# Patient Record
Sex: MALE | Race: BLACK OR AFRICAN AMERICAN | NOT HISPANIC OR LATINO | Employment: UNEMPLOYED | URBAN - METROPOLITAN AREA
[De-identification: names, ages, dates, MRNs, and addresses within clinical notes are randomized per-mention and may not be internally consistent; named-entity substitution may affect disease eponyms.]

---

## 2018-04-04 ENCOUNTER — HOSPITAL ENCOUNTER (EMERGENCY)
Facility: MEDICAL CENTER | Age: 38
End: 2018-04-04
Attending: EMERGENCY MEDICINE
Payer: MEDICARE

## 2018-04-04 VITALS
HEIGHT: 76 IN | OXYGEN SATURATION: 96 % | TEMPERATURE: 97.5 F | RESPIRATION RATE: 14 BRPM | WEIGHT: 156.53 LBS | DIASTOLIC BLOOD PRESSURE: 91 MMHG | HEART RATE: 74 BPM | SYSTOLIC BLOOD PRESSURE: 127 MMHG | BODY MASS INDEX: 19.06 KG/M2

## 2018-04-04 DIAGNOSIS — R44.3 HALLUCINATIONS: ICD-10-CM

## 2018-04-04 DIAGNOSIS — R05.9 COUGH: ICD-10-CM

## 2018-04-04 PROCEDURE — 99284 EMERGENCY DEPT VISIT MOD MDM: CPT

## 2018-04-04 RX ORDER — BUPROPION HYDROCHLORIDE 150 MG/1
150 TABLET, EXTENDED RELEASE ORAL DAILY
COMMUNITY
End: 2018-06-03

## 2018-04-04 RX ORDER — AZITHROMYCIN 250 MG/1
TABLET, FILM COATED ORAL
Qty: 6 TAB | Refills: 0 | Status: SHIPPED | OUTPATIENT
Start: 2018-04-04 | End: 2018-06-02

## 2018-04-04 RX ORDER — QUETIAPINE FUMARATE 200 MG/1
600 TABLET, FILM COATED ORAL
COMMUNITY
End: 2018-06-03

## 2018-04-04 ASSESSMENT — LIFESTYLE VARIABLES
VISUAL DISTURBANCES: NOT PRESENT
PAROXYSMAL SWEATS: NO SWEAT VISIBLE
NAUSEA AND VOMITING: NO NAUSEA AND NO VOMITING
AGITATION: NORMAL ACTIVITY
ANXIETY: NO ANXIETY (AT EASE)
AUDITORY DISTURBANCES: NOT PRESENT
ORIENTATION AND CLOUDING OF SENSORIUM: ORIENTED AND CAN DO SERIAL ADDITIONS
TOTAL SCORE: 0
HEADACHE, FULLNESS IN HEAD: NOT PRESENT
TREMOR: NO TREMOR
DO YOU DRINK ALCOHOL: NO

## 2018-04-04 ASSESSMENT — ENCOUNTER SYMPTOMS
COUGH: 1
HALLUCINATIONS: 1
FEVER: 0

## 2018-04-05 NOTE — ED NOTES
"Pt resting quietly in bed.  Pt states he is here for the voices in his head.  Pt states he hears satan and \"snakes talking to me\".  Pt states he can hear what they are saying but does not give examples at this time.  Pt states he has hx schizophrenia in [place].  Pt states he does not want to go back to [facility] because, \"the demons are ten fold there, it is much worse, I want to stay here.\"  Pt reporting taking seroquel and wellbutrin but has not been on either for the past week.  Pt denies drugs or alcohol at this time.  Pt calm and cooperative at this time, laying in bed in full view of nurses station.  "

## 2018-04-05 NOTE — ED NOTES
Pt discharged with prescription, information about follow up care, and address for shelter for tonight.  Pt stable on discharge, pt ambulated out.  Skin WNL.

## 2018-04-05 NOTE — CONSULTS
[facility]   TRIAGE ASSESSMENT    Name: Dallin Lowry  MRN: [id number]  : 1980  Age: 38 y.o.  Date of assessment: 2018  PCP: Pcp Pt States None  Persons in attendance: Patient    CHIEF COMPLAINT/PRESENTING ISSUE (as stated by patient): Patient was hearing [place] in the [place] and began hearing satanic voices telling him he is going to die.  Reports being scared and wanting medication.  Patient denies suicidal ideation.  Denies homicidal ideation. Patient to be seen by ERP.  Chief Complaint   Patient presents with   • Off Psych Meds     for less than 1 week, came from [facility] last night for better medical care        CURRENT LIVING SITUATION/SOCIAL SUPPORT: Patient homeless.  Recently moved from [place].    BEHAVIORAL HEALTH TREATMENT HISTORY  Does patient/parent report a history of prior behavioral health treatment for patient?   Yes:  Patient reports previos medications: Patient reports being allergic to Geodone, Haldol and Risperdal but unable to explain symptoms which occurred after he took the medication.    Dates Level of Care Facilty/Provider Diagnosis/Problem Medications   [facility]  Paranoid schizophrenia Seroquel 600mg nightly                                                                        SAFETY ASSESSMENT - SELF  Does patient acknowledge current or past symptoms of dangerousness to self? no  Does parent/significant other report patient has current or past symptoms of dangerousness to self? no  Does presenting problem suggest symptoms of dangerousness to self? No    SAFETY ASSESSMENT - OTHERS  Does patient acknowledge current or past symptoms of aggressive behavior or risk to others? no  Does parent/significant other report patient has current or past symptoms of aggressive behavior or risk to others?  N\A  Does presenting problem suggest symptoms of dangerousness to others? No    Crisis Safety Plan completed and copy given to patient?  "no    ABUSE/NEGLECT SCREENING  Does patient report feeling “unsafe” in his/her home, or afraid of anyone?  no  Does patient report any history of physical, sexual, or emotional abuse?  no  Does parent or significant other report any of the above? N\A  Is there evidence of neglect by self?  no  Is there evidence of neglect by a caregiver? no  Does the patient/parent report any history of CPS/APS/police involvement related to suspected abuse/neglect or domestic violence? no  Based on the information provided during the current assessment, is a mandated report of suspected abuse/neglect being made?  No    SUBSTANCE USE SCREENING  Yes:  Srinivasan all substances used in the past 30 days:       Last Use Amount   []   Alcohol     [x]   Marijuana     []   Heroin     []   Prescription Opioids  (used without prescription, for    recreation, or in excess of prescribed amount)     []   Other Prescription  (used without prescription, for    recreation, or in excess of prescribed amount)     []   Cocaine      [x]   Methamphetamine 3/29/18    []   \"\" drugs (ectasy, MDMA)     []   Other substances        UDS results: Pending  Breathalyzer results: 0.0    What consequences does the patient associate with any of the above substance use and or addictive behaviors? none    Risk factors for detox (check all that apply):  []  Seizures   []  Diaphoretic (sweating)   []  Tremors   []  Hallucinations   []  Increased blood pressure   []  Decreased blood pressure   []  Other   [x]  None      [x] Patient education on risk factors for detoxification and instructed to return to ER as needed.      MENTAL STATUS    Participation: Active verbal participation  Grooming: Disheveled    Behavior: Calm  Eye contact: Poor  Mood: Euthymic  Affect: Incongruent with content  Thought process: Goal-directed  Patient wants to go to psychiatric hospital  Speech: Rate within normal limits and Soft  Perception:  States the devil is talking to him however no " "evidence of hallucination during evaluation  Memory:  Poor memory for chronology of events  Insight: Poor  Judgment:  Adequate  Other:    Collateral information:   Source:  [] Significant other present in person:   [] Significant other by telephone  [] [facility]   [] [facility] Nursing Staff  [] [facility]  [] Other:     [] Unable to complete full assessment due to:  [] Acute intoxication  [] Patient declined to participate/engage  [] Patient verbally unresponsive  [] Significant cognitive deficits  [] Significant perceptual distortions or behavioral disorganization  [] Other:      CLINICAL IMPRESSIONS:  Primary:  Reports paranoid schizophrenia; however, vague answers to questions regarding the disease and process. The only detailed report of his hallucinations are as follows:   Stated, Voice only occurs after the sun goes down and smoking a cigarette greatly intensifies the voice he hears.  Patient reports \"hearing\"the voice in his right ear only.   Secondary:        IDENTIFIED NEEDS/PLAN:  [Trigger DISPOSITION list for any items marked]    []  Imminent safety risk - self [] Imminent safety risk - others   []  Acute substance withdrawal [x]  Psychosis/Impaired reality testing   []  Mood/anxiety []  Substance use/Addictive behavior   []  Maladaptive behaviro []  Parent/child conflict   []  Family/Couples conflict []  Biomedical   []  Housing []  Financial   []   Legal  Occupational/Educational   []  Domestic violence []  Other:     Disposition: defer    Does patient express agreement with the above plan? yes    Referral appointment(s) scheduled? no    Alert team only:   I have discussed findings and recommendations with Dr. Elmore who is in agreement with these recommendations.     Referral information sent to the following community providers :          Evans Garcia R.N.  4/4/2018                  "

## 2018-04-05 NOTE — ED PROVIDER NOTES
"ED Provider Note    ED Provider Note          CHIEF COMPLAINT  Chief Complaint   Patient presents with   • Off Psych Meds     for less than 1 week, came from [facility] last night for better medical care       HPI  Dallin Lowry is a 38 y.o. male who presents to the Emergency Department for concern of being off his psych meds. He left [place] to get 'better care' in [place]. He is reserved in telling me why he was in [place] or why he came to [place]. He is requesting Seroquel and says he is allergic to all other psych meds but can't tell me the reaction clearly. He is asking for a place to stay. He denies SI or HI. He says he does hear voices sometimes, he also see's ghosts. He denied drugs to me but told the triage nurse he does crystal meth    REVIEW OF SYSTEMS  Review of Systems   Constitutional: Negative for fever.   HENT: Negative for congestion.    Respiratory: Positive for cough.         Productive cough for 3 days   Cardiovascular: Negative for chest pain.   Psychiatric/Behavioral: Positive for hallucinations. Negative for self-injury and suicidal ideas.       PAST MEDICAL HISTORY   has a past medical history of Psychiatric disorder and Schizophrenia (CMS-[facility]) (2001).    SURGICAL HISTORY  patient denies any surgical history    SOCIAL HISTORY  Social History   Substance Use Topics   • Smoking status: Never Smoker   • Smokeless tobacco: Not on file   • Alcohol use No      History   Drug Use   • Types: Inhaled     Comment: crystal meth for about 5 yrs       FAMILY HISTORY  History reviewed. No pertinent family history.    CURRENT MEDICATIONS  Reviewed.  See Encounter Summary.     ALLERGIES  Allergies   Allergen Reactions   • Geodon [ ]      \"I get lock jaw\"   • Haldol [Haloperidol]      \"I get lock jaw\"   • Risperidone And Related      \"I get lock jaw\"       PHYSICAL EXAM  VITAL SIGNS: /91   Pulse 74   Temp 36.4 °C (97.5 °F)   Resp 14   Ht 1.93 m (6' 4\")   Wt 71 kg (156 lb 8.4 oz)   SpO2 96%   BMI " "19.05 kg/m²   Physical Exam   Constitutional: He is oriented to person, place, and time.   HENT:   Head: Normocephalic.   Eyes: Pupils are equal, round, and reactive to light.   Cardiovascular: Normal rate.    Pulmonary/Chest: Effort normal and breath sounds normal.   Neurological: He is alert and oriented to person, place, and time.   Skin: He is not diaphoretic.   Psychiatric:   Withdrawn, does not seem tangential, no normal speech, does not appear to be responding to internal stimuli             COURSE & MEDICAL DECISION MAKING  Pertinent Labs & Imaging studies reviewed. (See chart for details)    10:30 PM - Patient seen and examined at bedside.       Decision Making:  This is a 38 y.o. year old male who presents looking for a place to stay. He says that he has hallucinations thinking that he sees ghosts and hears voices. However when I spoke with him he did not seem to be responding to any sort of internal stimuli. He seemed also to be very interested when I offered helping arrange him a place to stay at a sheltered [facility]. I told the patient that I could call and get him into a shelter even after hours and he said that \"I would really like that\". He seems very guarded and why he left [place] and would not discuss it with me. He will not tell me whether or not he was in half-way or not. He just kept saying \"I'm not going back to [place] ever\". He is a full list of the resources here in [place] for mental health. I had offered to give him some sort of antipsychotic however everything I offered he said he was allergic to. He only wanted a prescription for a large amount of Seroquel which I did not feel comfortable giving him. I was worried about cervical being used for other addiction or abuse potentials and did not think that he needed Seroquel at this time. However he does have a productive sounding cough but lung sounds did sound clear possibly have a bronchitis and when I did suggest treating his " "cough he also seemed very pleased with that as well. He says that he smokes cigarettes daily. He denied drugs to me without later told the nurse that he does smoke crystal meth. I don't think he is acutely psychotic here. He is resting comfortably, has no pressured speech and does not appear to be responding to internal stimuli. He denied suicidal or homicidal thoughts. He said that he would \"I will not hurt anyone and I do not want her myself, I just want to get some help.\" When I offered him the list of resources, arrangements to a shelter and getting antibiotics he seemed to be less insistent on the fact that he needs to stay here tonight and more accepting of having alternatives. I do not think he meets criteria currently for legal hold. He is discharged in stable condition. I did personally call the shelter myself and they will let him in tonight when he gets there.  DISPOSITION:  Patient will be discharged home in stable condition.    FOLLOW UP:  No follow-up provider specified.    OUTPATIENT MEDICATIONS:  Discharge Medication List as of 4/4/2018 10:28 PM      START taking these medications    Details   azithromycin (ZITHROMAX) 250 MG Tab Take two tabs by mouth on day one, then one tab by mouth daily on days 2-5., Disp-6 Tab, R-0, Print Rx Paper             FINAL IMPRESSION  1. Cough    2. Hallucinations                   "

## 2018-04-05 NOTE — ED NOTES
Pt reports that he was running in the street because he was hearing voices. Pt states he was trying to run away from the voices, states he was not trying to kill himself.

## 2018-04-05 NOTE — DISCHARGE INSTRUCTIONS
I spoke with [facility] and they will take you in tonight.   Tomorrow you can go to [facility] the highlighted address on the sheet     Hallucinations and Delusions  You seem to be having hallucinations and/or delusions. You may be hearing voices that no one else can hear. This can seem very real to you. You may be having thoughts and fears that do not make sense to others. This condition can be due to mental disease like schizophrenia. It may be caused by a medical condition, such as an infection or electrolyte disturbance. These symptoms are also seen in drug abusers, especially those who use crack cocaine and amphetamines. Drugs like PCP, LSD, MDMA, peyote, and psilocybin can also cause frightening hallucinations and loss of control.  If your symptoms are due to drug abuse, your mental state should improve as the drug(s) leave your system. Someone you trust should be with you until you are better to protect you and calm your fears. Often tranquilizers are very helpful at controlling hallucinations, anxiety, and destructive behavior. Getting a proper diet and enough sleep is important to recovery. If your symptoms are not due to drugs, or do not improve over several days after stopping drug use, you need further medical or mental health care.  SEEK IMMEDIATE MEDICAL CARE IF:   · Your symptoms get worse, especially if you think your life is in danger  · You have violent or destructive thoughts.  Recovery is possible, but you have to get proper treatment and avoid drugs that are known to cause you trouble.  Document Released: 01/25/2006 Document Revised: 03/11/2013 Document Reviewed: 12/18/2006  [employer]® Patient Information ©2014 [employer].    
Patent

## 2018-06-02 ENCOUNTER — HOSPITAL ENCOUNTER (EMERGENCY)
Facility: MEDICAL CENTER | Age: 38
End: 2018-06-03
Attending: EMERGENCY MEDICINE
Payer: MEDICARE

## 2018-06-02 VITALS
TEMPERATURE: 97.8 F | HEART RATE: 67 BPM | RESPIRATION RATE: 17 BRPM | WEIGHT: 157.41 LBS | SYSTOLIC BLOOD PRESSURE: 143 MMHG | BODY MASS INDEX: 21.32 KG/M2 | OXYGEN SATURATION: 98 % | DIASTOLIC BLOOD PRESSURE: 87 MMHG | HEIGHT: 72 IN

## 2018-06-02 DIAGNOSIS — F20.0 PARANOID SCHIZOPHRENIA (HCC): ICD-10-CM

## 2018-06-02 LAB — POC BREATHALIZER: 0.02 PERCENT (ref 0–0.01)

## 2018-06-02 PROCEDURE — 99284 EMERGENCY DEPT VISIT MOD MDM: CPT

## 2018-06-02 PROCEDURE — 302970 POC BREATHALIZER: Performed by: EMERGENCY MEDICINE

## 2018-06-02 PROCEDURE — 302970 POC BREATHALIZER

## 2018-06-03 ENCOUNTER — HOSPITAL ENCOUNTER (EMERGENCY)
Facility: MEDICAL CENTER | Age: 38
End: 2018-06-03

## 2018-06-03 ENCOUNTER — HOSPITAL ENCOUNTER (EMERGENCY)
Facility: MEDICAL CENTER | Age: 38
End: 2018-06-03
Attending: EMERGENCY MEDICINE
Payer: MEDICARE

## 2018-06-03 VITALS
SYSTOLIC BLOOD PRESSURE: 128 MMHG | WEIGHT: 156.97 LBS | RESPIRATION RATE: 17 BRPM | HEART RATE: 63 BPM | HEIGHT: 72 IN | OXYGEN SATURATION: 98 % | DIASTOLIC BLOOD PRESSURE: 96 MMHG | BODY MASS INDEX: 21.26 KG/M2 | TEMPERATURE: 97.9 F

## 2018-06-03 VITALS
TEMPERATURE: 98.1 F | BODY MASS INDEX: 21.26 KG/M2 | HEIGHT: 72 IN | HEART RATE: 90 BPM | DIASTOLIC BLOOD PRESSURE: 83 MMHG | RESPIRATION RATE: 14 BRPM | SYSTOLIC BLOOD PRESSURE: 128 MMHG | WEIGHT: 157 LBS | OXYGEN SATURATION: 99 %

## 2018-06-03 DIAGNOSIS — F99 PSYCHIATRIC DISORDER: ICD-10-CM

## 2018-06-03 LAB
AMPHET UR QL SCN: NEGATIVE
BARBITURATES UR QL SCN: NEGATIVE
BENZODIAZ UR QL SCN: NEGATIVE
BZE UR QL SCN: NEGATIVE
CANNABINOIDS UR QL SCN: NEGATIVE
METHADONE UR QL SCN: NEGATIVE
OPIATES UR QL SCN: NEGATIVE
OXYCODONE UR QL SCN: NEGATIVE
PCP UR QL SCN: NEGATIVE
POC BREATHALIZER: 0 PERCENT (ref 0–0.01)
PROPOXYPH UR QL SCN: NEGATIVE

## 2018-06-03 PROCEDURE — 99284 EMERGENCY DEPT VISIT MOD MDM: CPT

## 2018-06-03 PROCEDURE — 302970 POC BREATHALIZER: Performed by: EMERGENCY MEDICINE

## 2018-06-03 PROCEDURE — 302449 STATCHG TRIAGE ONLY (STATISTIC)

## 2018-06-03 PROCEDURE — 90791 PSYCH DIAGNOSTIC EVALUATION: CPT

## 2018-06-03 PROCEDURE — 80307 DRUG TEST PRSMV CHEM ANLYZR: CPT

## 2018-06-03 ASSESSMENT — PAIN SCALES - GENERAL
PAINLEVEL_OUTOF10: 0
PAINLEVEL_OUTOF10: 8

## 2018-06-03 ASSESSMENT — LIFESTYLE VARIABLES: DO YOU DRINK ALCOHOL: NO

## 2018-06-03 NOTE — ED NOTES
Pt sitting on bed fully clothed with backpack on. Pt is polite, but does not engage in conversation.

## 2018-06-03 NOTE — DISCHARGE PLANNING
Alert Team:  Meet with patient.  Patient denies any SI or HI and states that he is from [place], [place] and wants to return there.  States that he was on Seroquel and Wellbutrin in the past and could return to [place] where he has gotten services in the past.  UDS positive for Amphetamines.  Appears disheveled but states that his plan is to return to [place] by bus. Reviewed with MD.

## 2018-06-03 NOTE — ED NOTES
Pt sleeping comfortably in bed, no s/s of distress noted. Call bell within reach, breathing is easy and unlabored. No new needs identified. Will continue to monitor.

## 2018-06-03 NOTE — ED PROVIDER NOTES
ED Provider Note    The pt is here awaiting an evaluation by [facility].  He states that 'people are after me because i'm going [place].'  He has no suicidal ideation, no homicidal ideations.  He has no medical complaints.   He has been seen by [facility] as well, and they feel is ok to go.

## 2018-06-03 NOTE — ED NOTES
Pt sleeping comfortably in bed, no s/s of distress noted. Call bell within reach, breathing is easy and unlabored.  Will continue to monitor.

## 2018-06-03 NOTE — DISCHARGE INSTRUCTIONS
Schizophrenia  Schizophrenia is a mental illness. It may cause disturbed or disorganized thinking, speech, or behavior. People with schizophrenia have problems functioning in one or more areas of life. People with schizophrenia are at increased risk for suicide, certain long-term (chronic) physical illnesses, and unhealthy behaviors, such as smoking and drug use.  People who have family members with schizophrenia are at higher risk of developing the illness. Schizophrenia affects men and women equally, but it usually appears at an earlier age (teenage or early adult years) in men.  What are the causes?  The cause of this condition is not known.  What increases the risk?  The following factors may make you more likely to develop this condition:  · Having a family member who has schizophrenia. Some gene combinations may increase the risk, but there is no single gene that causes schizophrenia.  · Impaired brain or neurotransmitter development or chemistry. Neurotransmitters are chemicals in the brain.  What are the signs or symptoms?  The earliest symptoms are often subtle and may go unnoticed until the illness becomes more severe (first-break psychosis). Symptoms of schizophrenia may be ongoing (continuous) or may come and go in severity. Episodes are often triggered by major life events, such as:  · Family stress.  · College.  ·  service.  · Marriage.  · Pregnancy or childbirth.  · Divorce.  · Loss of a loved one.  Symptoms may include:  · Seeing, hearing, or feeling things that do not exist (hallucinations).  · Having false beliefs (delusions). Delusions often involve beliefs that you are being attacked, harassed, cheated, persecuted, or conspired against (persecutory delusions).  · Speech that does not make sense to others or is hard to understand (incoherent).  · Behavior that is odd, confused, unfocused, withdrawn, or disorganized.  · Extremely overactive or underactive motor activity (catatonia). Motor  activity is any action that involves the muscles.  · Baldwin or blunted emotions (flat affect).  · Loss of will power (avolition).  · Withdrawal from social contacts (social isolation).  Symptoms may affect the level of functioning in one or more major areas of life, such as work, school, relationships, or self-care.  How is this diagnosed?  Schizophrenia is diagnosed through an assessment by a mental health care provider.  · Your mental health care provider may ask questions about:  ¨ Your thoughts, behavior, and mood.  ¨ Your ability to function in daily life.  ¨ Your medical history.  ¨ Any use of alcohol or drugs, including prescription medicines.  · You may have blood tests and imaging exams.  How is this treated?  Schizophrenia is a chronic illness that is best controlled with continuous treatment rather than treatment only when symptoms occur. The following treatments are used to manage schizophrenia:  · Medicine. This is the most effective and important form of treatment for schizophrenia. Antipsychotic medicines are usually prescribed to help manage schizophrenia. Other types of medicine may be added to relieve any symptoms that may occur despite the use of antipsychotic medicines.  · Counseling or talk therapy. Individual, group, or family counseling may be helpful in providing education, support, and guidance. Many people also benefit from social skills and job skills (vocational) training.  A combination of medicine and counseling is best for managing the disorder over time. A procedure in which electricity is applied to the brain through the scalp (electroconvulsive therapy) may be used to treat catatonic schizophrenia or schizophrenia in people who cannot take medicine or do not respond to medicine and counseling.  Follow these instructions at home:  · Keep stress under control. Stress may trigger psychosis and make symptoms worse.  · Try to get as much sleep as you can.  · Avoid alcohol and drugs. They  can affect how medicine works and make symptoms worse.  · Surround yourself with people who care about you and can help you manage your condition.  · Take over-the-counter and prescription medicines only as told by your health care provider.  · Keep all follow-up visits as told by your health care provider and counselor. This is important.  Contact a health care provider if:  · You have a bad response to changes in medicines or to your treatment plan.  · You have trouble falling sleep.  · You have a low mood that will not go away.  · You are using:  ¨ Drugs.  ¨ Too much caffeine.  ¨ Tobacco products.  ¨ Alcohol.  Get help right away if:  · You feel out of control.  · You or others notice warning signs of suicide such as:  ¨ Increased use of drugs or alcohol  ¨ Expressing feelings of not having a purpose in life, being trapped, guilty, anxious and agitated, or hopeless.  ¨ Withdrawing from friends and family.  ¨ Showing uncontrolled anger, recklessness, and dramatic mood changes.  ¨ Talking about suicide, discussing or searching for methods.  If you ever feel like you may hurt yourself or others, or have thoughts about taking your own life, get help right away. You can go to your nearest emergency department or call:  · Your local emergency services (911 in the [place]  · A suicide crisis helpline, such as the National Suicide Prevention Lifeline at 1-271.424.1305. This is open 24 hours a day.  Summary  · Schizophrenia is a mental illness that causes disturbed or disorganized thinking, speech, or behavior.  · Symptoms of schizophrenia may be ongoing or may come and go. They are often triggered by major life events.  · Keep stress under control. Stress may trigger psychosis and make symptoms worse.  · Avoid alcohol and drugs. They can affect how medicine works and make symptoms worse.  · Get help right away if you feel out of control.  This information is not intended to replace advice given to you by your health care  provider. Make sure you discuss any questions you have with your health care provider.  Document Released: 12/15/2001 Document Revised: 09/29/2017 Document Reviewed: 09/29/2017  Elsevier Interactive Patient Education © 2017 [employer]

## 2018-06-03 NOTE — ED NOTES
Med rec updated and complete  Allergies reviewed  Pt reports no prescription medications, OTC's, or vitamins for over a year or longer.  Pt reports no antibiotics in the last 30 days.  Pt reports that he does not want any medications and does not have a pharmacy to go too.

## 2018-06-03 NOTE — ED TRIAGE NOTES
"Pt reports he was dx with paranoid schizophrenia in 1998, rn asked if anything is different today and he states \"nothing\" . Pt wants to go to a mental health facility because he \"is a schizophrenic\". Pt states he is unable to hold a job, states his is homeless for 20 years. Pt denies drugs or alcohol. Pt denies SI/HI.   "

## 2018-06-03 NOTE — ED NOTES
"When last discharged he was told to follow up with [facility] but states he didn't have a \"GSP to know which direction to go\". Map printed with written directions. Pt verbalizes understanding and plans to followup with outpatient services for further mental health care. AAOx4, ambulates with steady gait out of department.   "

## 2018-06-03 NOTE — ED PROVIDER NOTES
"ED Provider Note    Scribed for Silvia Montemayor M.D. by Anastasia Patrick. 6/2/2018, 11:07 PM.    Primary care provider: Pcp Pt States None  Means of arrival: Walk in  History obtained from: Patient  History limited by: Psychiatric illness    CHIEF COMPLAINT  Chief Complaint   Patient presents with   • Psych Eval       HPI  Dallin Lowry is a 38 y.o. male with history of paranoid schizophrenia who presents to the Emergency Department for a psychiatric evaluation. He states he wants to go to a mental health facility at this time. The patient endorses delusions and states \"I know there are people who want to kill me out there.\" He is supposed to be on medications to manage his schizophrenia, but is not currently on any. The patient states he lives at the local shelters. He denies any recent drug use. The patient is negative for suicidal ideations or homicidal ideations. No alleviating or exacerbating factors are identified at this time.     REVIEW OF SYSTEMS  Pertinent positives include delusions. Pertinent negatives include no suicidal ideations or homicidal ideations. As above, all other systems are difficult to be obtained as the patient has psychiatric illness  See HPI for further details.   C.    PAST MEDICAL HISTORY  Past Medical History:   Diagnosis Date   • Psychiatric disorder    • Schizophrenia (HCC) 2001       SURGICAL HISTORY  History reviewed. No pertinent surgical history.    SOCIAL HISTORY  Social History   Substance Use Topics   • Smoking status: Never Smoker   • Smokeless tobacco: None noted   • Alcohol use No      History   Drug Use   • Types: Inhaled     Comment: crystal meth for about 5 yrs       FAMILY HISTORY  No pertinent family history noted.    CURRENT MEDICATIONS  Home Medications     Reviewed by Evy Monsalve R.N. (Registered Nurse) on 06/02/18 at 2472  Med List Status: Complete   Medication Last Dose Status   buPROPion SR (WELLBUTRIN-SR) 150 MG TABLET SR 12 HR sustained-release tablet  " "Active   quetiapine (SEROQUEL) 100 MG Tab  Active   QUEtiapine (SEROQUEL) 200 MG Tab  Active                ALLERGIES  Allergies   Allergen Reactions   • Geodon [ ]      \"I get lock jaw\"   • Haldol [Haloperidol]      \"I get lock jaw\"   • Risperidone And Related      \"I get lock jaw\"       PHYSICAL EXAM  VITAL SIGNS: /87   Pulse 67   Temp 36.6 °C (97.8 °F)   Resp 17   Ht 1.829 m (6')   Wt 71.4 kg (157 lb 6.5 oz)   SpO2 98%   BMI 21.35 kg/m²   Vitals reviewed.  Consitutional: Very thin. Negative for: distress.  HENT: Normocephalic, right external ear normal, left external ear normal, oropharynx clear and moist.  Eyes: Conjunctivae normal, extraocular movements normal. Negative for: discharge in right and left eye, icterus.  Neck: Range of motion normal, supple. Negative for cervical adenopathy.  Cardiovascular: Normal rate, regular rhythm, heart sounds normal, intact distal pulses. Negative for: murmur, rub, gallop.  Pulmonary/Chest Wall: Effort normal, breath sounds normal. Negative for: respiratory distress, wheezes, rales, rhonchi.   Musculoskeletal: Normal range of motion. Negative for edema.  Neurological: Alert and oriented x3. No focal deficits.  Skin: Warm, dry. Negative for rash.  Psych: Avoids eye contact. Pressured speech. Paranoid. Denies SI/HI.    DIAGNOSTIC STUDIES / PROCEDURES    LABS  Results for orders placed or performed during the hospital encounter of 06/02/18   POC BREATHALIZER   Result Value Ref Range    POC Breathalizer 0.02 (A) 0.00 - 0.01 Percent     All labs reviewed by me.    COURSE & MEDICAL DECISION MAKING  Nursing notes, VS, PMSFHx reviewed in chart.      11:07 PM Patient seen and examined at bedside. The patient presents with paranoia, noncompliant with medications. Ordered POC Breathalizer and Urine Drug Screen. He will be evaluated by behavioral health for appropriate disposition.       FINAL IMPRESSION  1. Paranoid schizophrenia (HCC)          Anastasia MORENO (Scribjerrod), am " scribing for, and in the presence of, Silvia Montemayor M.D..    Electronically signed by: Anastasia Patrick (Scribe), 6/2/2018    ISilvia M.D. personally performed the services described in this documentation, as scribed by Anastasia Patrick in my presence, and it is both accurate and complete.    The note accurately reflects work and decisions made by me.  Silvia Montemayor  6/3/2018  1:24 AM

## 2018-06-03 NOTE — CONSULTS
"[facility]   TRIAGE ASSESSMENT    Name: Dallin Lowry  MRN: [id number]  : 1980  Age: 38 y.o.  Date of assessment: 6/3/2018  PCP: Pcp Pt States None  Persons in attendance: Patient    CHIEF COMPLAINT/PRESENTING ISSUE (as stated by Patient): This is a 38 year old male seen seated on the edge of the hospital bed, nodding in and out of sleep. Patient easy to arouse however unwilling to engage in the interview process. Patient appeared disheveled with poor hygiene. Patient states, \"stop calling my name, you are making me worse\". Patient states, \"people are following me\". Patient declined to explain or answer any other questions at this time.   Chief Complaint   Patient presents with   • Psych Eval        CURRENT LIVING SITUATION/SOCIAL SUPPORT: Patient has been staying at the shelter currently but declines to go back there upon discharge.    BEHAVIORAL HEALTH TREATMENT HISTORY  Does patient/parent report a history of prior behavioral health treatment for patient?   Yes:    Dates Level of Care Facilty/Provider Diagnosis/Problem Medications   3/28/[facility] Paranoid schzophrenia  Patient states he declines to take psychotropic medications                                                                        SAFETY ASSESSMENT - SELF  Does patient acknowledge current or past symptoms of dangerousness to self? no  Does parent/significant other report patient has current or past symptoms of dangerousness to self? N\A  Does presenting problem suggest symptoms of dangerousness to self? No    SAFETY ASSESSMENT - OTHERS  Does patient acknowledge current or past symptoms of aggressive behavior or risk to others? no  Does parent/significant other report patient has current or past symptoms of aggressive behavior or risk to others?  N\A  Does presenting problem suggest symptoms of dangerousness to others? No    Crisis Safety Plan " "completed and copy given to patient? no    ABUSE/NEGLECT SCREENING  Does patient report feeling “unsafe” in his/her home, or afraid of anyone?  yes  Does patient report any history of physical, sexual, or emotional abuse?  no  Does parent or significant other report any of the above? N\A  Is there evidence of neglect by self?  yes  Is there evidence of neglect by a caregiver? no  Does the patient/parent report any history of CPS/APS/police involvement related to suspected abuse/neglect or domestic violence? no  Based on the information provided during the current assessment, is a mandated report of suspected abuse/neglect being made?  No    SUBSTANCE USE SCREENING  Yes:  Srinivasan all substances used in the past 30 days:      Last Use Amount   []   Alcohol     []   Marijuana     []   Heroin     []   Prescription Opioids  (used without prescription, for    recreation, or in excess of prescribed amount)     []   Other Prescription  (used without prescription, for    recreation, or in excess of prescribed amount)     []   Cocaine      [x]   Methamphetamine history of use per old record     []   \"\" drugs (ectasy, MDMA)     []   Other substances        UDS results: pending  Breathalyzer results: 0.02    What consequences does the patient associate with any of the above substance use and or addictive behaviors? Work problems or losses: , Relationship problems:  Other: homelessness    Risk factors for detox (check all that apply):  []  Seizures   []  Diaphoretic (sweating)   []  Tremors   []  Hallucinations   []  Increased blood pressure   []  Decreased blood pressure   []  Other   []  None      [] Patient education on risk factors for detoxification and instructed to return to ER as needed.      MENTAL STATUS   Participation: Limited verbal participation, Guarded, Defensive and Resistant  Grooming: Disheveled  Orientation: Drowsy/Somnolent  Behavior: Agitated  Eye contact: Poor  Mood: Angry and Irritable  Affect: " Blunted  Thought process: Circumstantial  Thought content: Paranoia  Speech: Pressured  Perception: delusional  Memory:  Recent:  Limited  Insight: Poor  Judgment:  Poor  Other:    Collateral information:   Source:  [] Significant other present in person:   [] Significant other by telephone  [] [facility]   [x] [facility] Nursing Staff  [x] [facility]  [] Other:     [] Unable to complete full assessment due to:  [] Acute intoxication  [] Patient declined to participate/engage  [] Patient verbally unresponsive  [] Significant cognitive deficits  [] Significant perceptual distortions or behavioral disorganization  [] Other:      CLINICAL IMPRESSIONS:  Primary:  Paranoid schizophrenia   Secondary:  Personality disorder NOS       IDENTIFIED NEEDS/PLAN:  [Trigger DISPOSITION list for any items marked]    []  Imminent safety risk - self [] Imminent safety risk - others   []  Acute substance withdrawal [x]  Psychosis/Impaired reality testing   []  Mood/anxiety []  Substance use/Addictive behavior   []  Maladaptive behaviro []  Parent/child conflict   []  Family/Couples conflict []  Biomedical   []  Housing []  Financial   []   Legal  Occupational/Educational   []  Domestic violence []  Other:     Disposition: Defer    Does patient express agreement with the above plan? yes    Referral appointment(s) scheduled? N\A    Alert team only:   I have discussed findings and recommendations with Dr. Montemayor who is in agreement with these recommendations.           Aura Peralta, Ph.D.  6/3/2018

## 2018-06-03 NOTE — ED TRIAGE NOTES
"Ambulatory to triage stating that \"you guys need to send me to [facility]\". Has been seen at this facility and [facility] today and has been given resource sheets and instructions to followup with [facility] outpatient but states \"I couldn't do it, I couldn't find it\". States hx of schizophrenia and verbalizes feelings of paranoia and that \"people are out to get me\". Denies SI/HI. Explained triage process, to waiting room. Asked to inform RN if questions or concerns arise.   "

## 2018-06-04 NOTE — DISCHARGE PLANNING
"Alert Team:  Consult has already been completed for today.  Patient was discharged and referred to [facility] and shelter.  Patient states that he went to [facility] and \"they did nothing for me, returned here and given referral to [facility] for tomorrow am. And can return to shelter.  Patient returned again and was placed in room 33.  Patient states that he is a paranoid schizophrenic and is responding to internal stimuli AEB ou burst of laughter for no reason.  Patient states again that he is here cause [place] can't afford his mental health care.  Writer believes that patient is melingering and has no home or place for treatment.  If MD feels appropriate DC patient to go to [facility] outpt services in AM. Or could DC tonight.  No suicidal or homicidal thoughts. Has been homeless for the past 20 years. Patient is avoidant of answering questions and gets angry and verbally aggressive at times.   "

## 2018-06-04 NOTE — ED NOTES
MD AND ALERT RN VERBALLY DISCHARGED PT.  PT UNHAPPY AND LEFT WITHOUT VITAL SIGNS REPEATED OR SIGNING DISCHARGE.

## 2018-06-05 ENCOUNTER — HOSPITAL ENCOUNTER (EMERGENCY)
Facility: MEDICAL CENTER | Age: 38
End: 2018-06-05
Attending: EMERGENCY MEDICINE
Payer: MEDICARE

## 2018-06-05 VITALS
DIASTOLIC BLOOD PRESSURE: 78 MMHG | HEIGHT: 72 IN | BODY MASS INDEX: 21.47 KG/M2 | HEART RATE: 90 BPM | SYSTOLIC BLOOD PRESSURE: 130 MMHG | RESPIRATION RATE: 16 BRPM | OXYGEN SATURATION: 96 % | WEIGHT: 158.51 LBS | TEMPERATURE: 97.6 F

## 2018-06-05 DIAGNOSIS — F20.9 SCHIZOPHRENIA, UNSPECIFIED TYPE (HCC): ICD-10-CM

## 2018-06-05 PROCEDURE — 99284 EMERGENCY DEPT VISIT MOD MDM: CPT

## 2018-06-05 ASSESSMENT — ENCOUNTER SYMPTOMS
SHORTNESS OF BREATH: 0
INSOMNIA: 0
HEADACHES: 0
NERVOUS/ANXIOUS: 0
FEVER: 0
HALLUCINATIONS: 1

## 2018-06-05 ASSESSMENT — LIFESTYLE VARIABLES: SUBSTANCE_ABUSE: 1

## 2018-06-05 ASSESSMENT — PAIN SCALES - GENERAL: PAINLEVEL_OUTOF10: 0

## 2018-06-05 NOTE — DISCHARGE PLANNING
Patient refusing to speak with this writer.  Unwilling to answer any questions regarding his claim of being schizophrenic. Unable to assess.

## 2018-06-05 NOTE — ED NOTES
Patient incredibly verbally aggressive in triage with Dr. Ha and this RN. Refusing to engage in meaningful discussion or discourse.     Security called to triage per Dr. Ha to escort patient from ED.    Patient escorted from triage out of ED by Security.

## 2018-06-05 NOTE — ED NOTES
"Alert Team present in triage to speak with patient. Patient repeating \"I need to go to [facility]. We're going in circles over and over. Why are you arguing with me. I have schizophrenic attacks. I need to go to [facility]. We keep going around and around.\"    Patient becoming more and more verbally aggressive throughout interview, refusing to speak with Alert Team, yelling that he needs to leave the room.    This RN attempting again to speak with patient. Patient continually interrupting RN refusing to let RN finish statements.    Patient keeps stating \"I'm too schizophrenic to get to [facility] on my own. There's different levels of schizophrenic. I'm low level. I'm too schizophrenic to get to [facility]. I'm having a schizophrenic attack. I just need to go to [facility].\"    Patient agreeing to speak again with Alert Team. Alert Team again called and in triage to speak with patient.    Patient again becoming verbally aggressive, refusing to answer questions, talking over staff, and refusing to let staff finish sentences.  "

## 2018-06-12 NOTE — ED PROVIDER NOTES
"ED Provider Note    CHIEF COMPLAINT  Chief Complaint   Patient presents with   • Psych Eval       HPI  Dallin Lowry is a 38 y.o. male who presents to the emergency department brought in for psychiatric evaluation.  Currently the patient has had multiple recent ER visits he says that he is schizophrenic and is having hallucinations.  He was supposed to follow-up at [facility] but says that he could not find the [place].  He does not wish to harm himself or others.    REVIEW OF SYSTEMS no fever chills nausea vomiting diarrhea cough difficulty breathing.    PAST MEDICAL HISTORY  Past Medical History:   Diagnosis Date   • Auditory hallucinations    • Psychiatric disorder    • Schizophrenia (HCC) 2001       FAMILY HISTORY  No family history on file.    SOCIAL HISTORY  Social History     Social History   • Marital status: Single     Spouse name: N/A   • Number of children: N/A   • Years of education: N/A     Social History Main Topics   • Smoking status: Current Every Day Smoker     Packs/day: 0.50     Types: Cigarettes   • Smokeless tobacco: Never Used   • Alcohol use No   • Drug use: No      Comment: crystal meth for about 5 yrs   • Sexual activity: Not on file     Other Topics Concern   • Not on file     Social History Narrative   • No narrative on file       SURGICAL HISTORY  No past surgical history on file.    CURRENT MEDICATIONS  Home Medications     Reviewed by Kelsi Miller (Pharmacy Tech) on 06/03/18 at 1527  Med List Status: Complete   Medication Last Dose Status        Patient Kevin Taking any Medications                       ALLERGIES  Allergies   Allergen Reactions   • Geodon [ ]      \"I get lock jaw\"   • Haldol [Haloperidol]      \"I get lock jaw\"   • Risperidone And Related      \"I get lock jaw\"       PHYSICAL EXAM  VITAL SIGNS: /96   Pulse 63   Temp 36.6 °C (97.9 °F)   Resp 17   Ht 1.829 m (6')   Wt 71.2 kg (156 lb 15.5 oz)   SpO2 98%   BMI 21.29 kg/m²  "   Oxygen saturation is interpreted as adequate  Constitutional: Awake verbal nontoxic-appearing  HENT: No sign of acute trauma to the head mucous membranes are moist  Eyes: No erythema or discharge or jaundice  Neck: Trachea midline no JVD  Cardiovascular: Regular rate and rhythm  Lungs: Clear and equal bilaterally with no apparent difficulty breathing  Skin: Warm and dry  Musculoskeletal: No acute bony deformity  Neurologic: Awake verbal ambulatory    Laboratory  Breath alcohol test shows a value of 0 and urine toxicology screen is completely negative    MEDICAL DECISION MAKING and DISPOSITION  The patient is familiar with our  and was evaluated and at this point in time we do not feel that he is at acute risk to harm self or others and he does not need to be emergently transferred to a psychiatric hospital and the patient is discharged and advised to go to [facility] for outpatient mental health care    IMPRESSION  1.  Psychiatric disorder      Electronically signed by: Saqib Hagen, 6/11/2018 9:03 PM

## 2018-07-29 ENCOUNTER — HOSPITAL ENCOUNTER (EMERGENCY)
Facility: MEDICAL CENTER | Age: 38
End: 2018-07-29
Attending: EMERGENCY MEDICINE
Payer: MEDICARE

## 2018-07-29 VITALS — BODY MASS INDEX: 21.67 KG/M2 | RESPIRATION RATE: 17 BRPM | WEIGHT: 160 LBS | HEIGHT: 72 IN | HEART RATE: 88 BPM

## 2018-07-29 DIAGNOSIS — F99 PSYCHIATRIC DISORDER: ICD-10-CM

## 2018-07-29 PROCEDURE — 99284 EMERGENCY DEPT VISIT MOD MDM: CPT

## 2018-07-29 NOTE — DISCHARGE INSTRUCTIONS
Return here if you require emergency medical care, otherwise take your medicines as prescribed by your doctor [facility]

## 2018-07-30 NOTE — ED NOTES
Patient uncooperative with this RN and life skills.  Patient known to this facility.  Patient interrupting staff.  Not allowing this RN to get vitals.  Security outside of room.  Dr Hagen in room to assess patient.  Patient reports to being at [facility] this morning.  Patient not complaining of anything, interrupting RN

## 2018-07-30 NOTE — ED PROVIDER NOTES
"ED Provider Note    CHIEF COMPLAINT  Chief Complaint   Patient presents with   • Other       HPI  Dallin Lowry is a 38 y.o. male who presents to the emergency department brought in by the police.  Patient is an extremely frequent emergency department visitor he has a history of schizophrenia.  Apparently the patient was seen at [facility] earlier this morning he was discharged and then he flagged down a NHP officer while [place] to the free[place] and he apparently initially wanted to be dropped off at the [address] but then changed his mind and told the  he wanted to come to this hospital.  The patient says he has not been taking his medications he does not have any other specific complaint he does not wish to harm himself or others.    REVIEW OF SYSTEMS no trauma no fever vomiting diarrhea the patient does not wish to harm self or others.  All other systems negative    PAST MEDICAL HISTORY  Past Medical History:   Diagnosis Date   • Auditory hallucinations    • Psychiatric disorder    • Schizophrenia (HCC) 2001       FAMILY HISTORY  No family history on file.    SOCIAL HISTORY  Social History     Social History   • Marital status: Single     Spouse name: N/A   • Number of children: N/A   • Years of education: N/A     Social History Main Topics   • Smoking status: Current Every Day Smoker     Packs/day: 0.50     Types: Cigarettes   • Smokeless tobacco: Never Used   • Alcohol use No   • Drug use: No      Comment: crystal meth for about 5 yrs   • Sexual activity: Not on file     Other Topics Concern   • Not on file     Social History Narrative   • No narrative on file       SURGICAL HISTORY  No past surgical history on file.    CURRENT MEDICATIONS  Home Medications    **Home medications have not yet been reviewed for this encounter**         ALLERGIES  Allergies   Allergen Reactions   • Geodon [ ]      \"I get lock jaw\"   • Haldol [Haloperidol]      \"I get lock jaw\"   • Risperidone And Related      " "\"I get lock jaw\"       PHYSICAL EXAM  VITAL SIGNS: Pulse 88   Resp 17   Ht 1.829 m (6')   Wt 72.6 kg (160 lb)   BMI 21.70 kg/m²    Oxygen saturation is interpreted as adequate  Constitutional: The patient is awake he is quite talkative but cooperative.  HENT: No sign of trauma to the head, mucous membranes are moist  Eyes: Pupils round extraocular motion present  Neck: Trachea midline no JVD  Cardiovascular: Regular rate and rhythm  Lungs: Clear and equal bilaterally with no apparent difficulty breathing  Skin: Warm and dry  Musculoskeletal: No acute bony deformity  Neurologic: Awake to verbal ambulatory    MEDICAL DECISION MAKING and DISPOSITION  The patient was seen by myself in her  and we are both quite familiar with this patient and he appears to be at his baseline today.  He does not express any wish to harm himself or others.  I do not think that he needs emergency hospitalization or further emergency evaluation at this time and the patient is encouraged to take his psychiatric medications as prescribed and to follow-up at [facility].  He may return here if he has a emergency medical condition    IMPRESSION  1.  Psychiatric disorder         Electronically signed by: Saqib Hagen, 7/29/2018 7:33 PM      "

## 2018-07-30 NOTE — DISCHARGE PLANNING
"Alert Team:  Patient waved down a NHP and at first requested to go to the [facility] and than just told him to bring him to the hospital.  Patient was discharged from [facility] this am per pt and has no complaints to us except, \"my needs are not met.\" Patient was given water and crackers by security per his request.   Patient has been in the ER 6 times in the last 30-45 days and each time was referred out.  Patient given information about [facility] outpt and advised to follow up there tomorrow am. Patient left the ER without problem with security.    "

## 2018-12-07 ENCOUNTER — HOSPITAL ENCOUNTER (EMERGENCY)
Facility: MEDICAL CENTER | Age: 38
End: 2018-12-08
Attending: EMERGENCY MEDICINE
Payer: MEDICARE

## 2018-12-07 DIAGNOSIS — R44.3 HALLUCINATIONS: ICD-10-CM

## 2018-12-07 LAB — POC BREATHALIZER: 0 PERCENT (ref 0–0.01)

## 2018-12-07 PROCEDURE — 99285 EMERGENCY DEPT VISIT HI MDM: CPT

## 2018-12-07 PROCEDURE — 80307 DRUG TEST PRSMV CHEM ANLYZR: CPT

## 2018-12-07 PROCEDURE — 302970 POC BREATHALIZER: Performed by: EMERGENCY MEDICINE

## 2018-12-07 ASSESSMENT — LIFESTYLE VARIABLES: DO YOU DRINK ALCOHOL: NO

## 2018-12-08 VITALS
OXYGEN SATURATION: 98 % | TEMPERATURE: 98.1 F | DIASTOLIC BLOOD PRESSURE: 84 MMHG | WEIGHT: 158.29 LBS | SYSTOLIC BLOOD PRESSURE: 127 MMHG | HEART RATE: 77 BPM | HEIGHT: 72 IN | RESPIRATION RATE: 16 BRPM | BODY MASS INDEX: 21.44 KG/M2

## 2018-12-08 LAB
AMPHET UR QL SCN: NEGATIVE
BARBITURATES UR QL SCN: NEGATIVE
BENZODIAZ UR QL SCN: NEGATIVE
BZE UR QL SCN: NEGATIVE
CANNABINOIDS UR QL SCN: NEGATIVE
METHADONE UR QL SCN: NEGATIVE
OPIATES UR QL SCN: NEGATIVE
OXYCODONE UR QL SCN: NEGATIVE
PCP UR QL SCN: NEGATIVE
PROPOXYPH UR QL SCN: NEGATIVE

## 2018-12-08 PROCEDURE — 90791 PSYCH DIAGNOSTIC EVALUATION: CPT

## 2018-12-08 ASSESSMENT — PAIN SCALES - GENERAL: PAINLEVEL_OUTOF10: 0

## 2018-12-08 NOTE — ED NOTES
Patient given DC paperwork and cab voucher to the shelter. Patient instructed to follow up with mental health in 2 days. Patient told to return to the ER for new or worsening symptoms. Patient A&O x4 and verbalizes understanding of instructions. Patient ambulatory with steady gait.

## 2018-12-08 NOTE — DISCHARGE PLANNING
Medical Social Work     SW spoke to the ERP about the discharge plan and advised the ERP the pt can go to the homeless shelter. SW advised the ERP that we would provide a taxi voucher for the pt. SW provided a taxi voucher for the pt     Plan: SW will remain available for pt support.

## 2018-12-08 NOTE — ED PROVIDER NOTES
ED Provider Note    CHIEF COMPLAINT  Chief Complaint   Patient presents with   • Hallucinations       HPI  Dallin Lowry is a 38 y.o. male who presents with auditory hallucinations.  The patient states he has a history of schizophrenia and that he is currently homeless.  He states he is from [place] and is not been on his Seroquel for quite some time.  The patient states the voices are telling him to kill himself.  He states he does not have a specific plan but does not feel comfortable leaving the hospital as he states that he may harm himself.  He does not have any current medical complaints.  He states that he abused amphetamines approximately 2 weeks ago.    REVIEW OF SYSTEMS  See HPI for further details. All other systems are negative.     PAST MEDICAL HISTORY  Past Medical History:   Diagnosis Date   • Auditory hallucinations    • Psychiatric disorder    • Schizophrenia (HCC) 2001       SOCIAL HISTORY  Social History     Social History   • Marital status: Single     Spouse name: N/A   • Number of children: N/A   • Years of education: N/A     Social History Main Topics   • Smoking status: Current Every Day Smoker     Packs/day: 0.50     Types: Cigarettes   • Smokeless tobacco: Never Used   • Alcohol use No   • Drug use: No      Comment:  meth   • Sexual activity: Not on file     Other Topics Concern   • Not on file     Social History Narrative   • No narrative on file           PHYSICAL EXAM  VITAL SIGNS: /88   Pulse 82   Temp 36.7 °C (98.1 °F) (Temporal)   Resp 16   Ht 1.829 m (6')   Wt 71.8 kg (158 lb 4.6 oz)   SpO2 98%   BMI 21.47 kg/m²   Constitutional: Unkempt but no acute distress.   HENT: Normocephalic, Atraumatic, tympanic membranes are intact and nonerythematous bilaterally, Oropharynx moist without exudates or erythema, Nose normal.   Eyes: PERRLA, EOMI, Conjunctiva normal.  Neck: Supple without meningismus  Lymphatic: No lymphadenopathy noted.   Cardiovascular: Normal heart rate,  Normal rhythm, No murmurs, No rubs, No gallops.   Thorax & Lungs: Normal breath sounds, No respiratory distress, No wheezing, No chest tenderness.   Abdomen: Bowel sounds normal, Soft, No tenderness, no rebound, no guarding, no distention, No masses, No pulsatile masses.   Skin: Warm, Dry, No erythema, No rash.   Back: No tenderness, No CVA tenderness.   Extremities: Atraumatic with symmetric distal pulses, No edema, No tenderness, No cyanosis, No clubbing.   Neurologic: Alert & oriented x 3, cranial nerves II through XII are intact, Normal motor function, Normal sensory function, No focal deficits noted.   Psychiatric: Ongoing suicidal ideation without a specific plan    COURSE & MEDICAL DECISION MAKING  Pertinent Labs & Imaging studies reviewed. (See chart for details)  This a 38-year-old male who presents the emerge department with auditory hallucinations and suicidal ideation.  Therefore the patient be placed on a legal hold.  [facility] will be contacted and a drug screen and breathalyzer is pending.  He is currently medically cleared for further psychiatric care.    FINAL IMPRESSION  1.  Auditory hallucinations  2.  Suicidal ideation  3.  Recreational drug abuse  4.  Medical clearance for psychosis     Disposition  The patient will be transferred in stable condition    Electronically signed by: Nicholas Toth, 12/7/2018 11:32 PM      [facility] has evaluated the patient and they feel he is appropriate for outpatient management.  In speaking with the patient he states that he will go to the homeless shelter this evening.  He states that he will follow-up for outpatient management.  At this time is unclear if he can go back to [place] or stand [place].  [employer] states that he has been here multiple times with similar complaints and he has been noncompliant.  At the time of discharge he does contract for safety and he will be discharged home in stable condition.  Of note the patient has not had any  obvious hallucinations while in the emergency department.
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
## 2018-12-08 NOTE — ED TRIAGE NOTES
"Pt came just moved from [place], [place], currently hearing non command voices, denies si/hi.  Would just like help to get back on meds \" that are not working\"  Pt is non compliant with current meds. Denies chest pain, sob.  Pt does believed people are out \" to kill me\".     "

## 2018-12-09 ENCOUNTER — HOSPITAL ENCOUNTER (EMERGENCY)
Facility: MEDICAL CENTER | Age: 38
End: 2018-12-09
Attending: EMERGENCY MEDICINE
Payer: MEDICARE

## 2018-12-09 VITALS
BODY MASS INDEX: 21.43 KG/M2 | DIASTOLIC BLOOD PRESSURE: 76 MMHG | TEMPERATURE: 98.1 F | HEART RATE: 78 BPM | OXYGEN SATURATION: 99 % | RESPIRATION RATE: 18 BRPM | SYSTOLIC BLOOD PRESSURE: 124 MMHG | WEIGHT: 158 LBS

## 2018-12-09 DIAGNOSIS — R44.0 AUDITORY HALLUCINATIONS: ICD-10-CM

## 2018-12-09 DIAGNOSIS — R45.1 AGITATION: ICD-10-CM

## 2018-12-09 LAB — POC BREATHALIZER: 0 PERCENT (ref 0–0.01)

## 2018-12-09 PROCEDURE — 302970 POC BREATHALIZER: Performed by: EMERGENCY MEDICINE

## 2018-12-09 PROCEDURE — A9270 NON-COVERED ITEM OR SERVICE: HCPCS | Performed by: EMERGENCY MEDICINE

## 2018-12-09 PROCEDURE — 99284 EMERGENCY DEPT VISIT MOD MDM: CPT

## 2018-12-09 PROCEDURE — 700102 HCHG RX REV CODE 250 W/ 637 OVERRIDE(OP): Performed by: EMERGENCY MEDICINE

## 2018-12-09 PROCEDURE — 90791 PSYCH DIAGNOSTIC EVALUATION: CPT | Performed by: PSYCHOLOGIST

## 2018-12-09 RX ORDER — QUETIAPINE FUMARATE 100 MG/1
200 TABLET, FILM COATED ORAL ONCE
Status: COMPLETED | OUTPATIENT
Start: 2018-12-09 | End: 2018-12-09

## 2018-12-09 RX ADMIN — QUETIAPINE 200 MG: 100 TABLET, FILM COATED ORAL at 18:30

## 2018-12-09 ASSESSMENT — PAIN SCALES - GENERAL
PAINLEVEL_OUTOF10: 0
PAINLEVEL_OUTOF10: 0

## 2018-12-10 NOTE — ED TRIAGE NOTES
.  Chief Complaint   Patient presents with   • Paranoid   • Agitation   pt ambulated to triage hx schizophrenia. Not currently on medications. Pt is from [place]. Voices telling him there is someone trying to kill him. Pt stating he has been seen at [facility] trying to get psychiatric care. Pt has bed at [facility].
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
## 2018-12-10 NOTE — ED PROVIDER NOTES
ED Provider Note    CHIEF COMPLAINT  Chief Complaint   Patient presents with   • Paranoid   • Agitation       HPI  Dallin Lowry is a 38 y.o. male who presents to emergency department with chief complaint of hallucinations.  Patient has a history of paranoid schizophrenia he was actually discharged from here about 36 hours ago after being placed on a cycle legal hold.  He is homeless and from [place] he has been in and out of the renal area a lot lately.  He states he needs to be admitted to a mental health facility because he is having these auditory hallucinations.  He currently has no suicidal ideation but states that if we were to let him go that he knows because he is crazy that someone would kill him.  He states people are following on the buses and want to kill him.  He cannot state who precisely this is but feels like it is our fault if we let him go if he gets hurt.  States he has auditory and visual hallucinations used to take Seroquel but has not taken it in over a year.    REVIEW OF SYSTEMS  Positives as above. Pertinent negatives include suicidal ideation visual hallucinations chest pain shortness of breath  All other review of systems are negative    PAST MEDICAL HISTORY   has a past medical history of Auditory hallucinations; Psychiatric disorder; and Schizophrenia ([facility]) (2001).    SOCIAL HISTORY  Social History     Social History Main Topics   • Smoking status: Current Every Day Smoker     Packs/day: 0.50     Types: Cigarettes   • Smokeless tobacco: Never Used   • Alcohol use No   • Drug use: No      Comment:  meth   • Sexual activity: Not on file       SURGICAL HISTORY  patient denies any surgical history    CURRENT MEDICATIONS  Home Medications     Reviewed by Taryn Mcneil R.N. (Registered Nurse) on 12/09/18 at 1709  Med List Status: Complete   Medication Last Dose Status        Patient Kevin Taking any Medications                       ALLERGIES  Allergies   Allergen Reactions   •  "Zyprexa      \"jaw lock up\"   • Geodon [ ]      \"I get lock jaw\"   • Haldol [Haloperidol]      \"I get lock jaw\"   • Risperidone And Related      \"I get lock jaw\"       PHYSICAL EXAM  VITAL SIGNS: /85   Pulse 63   Temp 36.7 °C (98.1 °F) (Temporal)   Resp 16   Wt 71.7 kg (158 lb)   SpO2 99%   BMI 21.43 kg/m²   Pulse ox interpretation: I interpret this pulse ox as normal.  Constitutional: Alert and mildly agitated  HENT: Normocephalic atraumatic, MMM  Eyes: PER, Conjunctiva normal, Non-icteric.   Neck: Normal range of motion, No tenderness, Supple, No stridor.   Cardiovascular: Regular rate and rhythm, no murmurs.   Thorax & Lungs: Normal breath sounds, No respiratory distress, No wheezing, No chest tenderness.   Back: No bony tenderness, No CVA tenderness.   Extremities: Intact distal pulses, No edema, No tenderness, No cyanosis  Musculoskeletal: Good range of motion in all major joints. No tenderness to palpation or major deformities noted.   Neurologic: Alert and oriented x3, No focal deficits noted.   Psychiatric: Easily agitated and aggressive verbally not physically aggressive denies suicidal ideation denies homicidal ideation and paranoid delusions      DIFFERENTIAL DIAGNOSIS AND WORK UP PLAN    This is a 38 y.o. male who presents with a long history of paranoid schizophrenia his last cycle legal hold here over 36 hours ago he told our psych nurse that he would not take the Seroquel while he was homeless and we need to get him at home.  Currently endorsing audio hallucinations but not responding to them stating that he is paranoid or that someone is trying to kill him.  He is not suicidal nor homicidal he Shukri has a bed at the homeless shelter he knows where his resources and states that he can go to [place] but he scared to take the bus.  At this time I do not quite see a reason to place him on a cycle legal hold but I will have behavioral health to evaluate him as well.    DIAGNOSTIC STUDIES / " PROCEDURES    LABS  Pertinent Lab Findings  Breathalyzer negative  Labs Reviewed - No data to display    RADIOLOGY  No orders to display     The radiologist's interpretation of all radiological studies have been reviewed by me.      COURSE & MEDICAL DECISION MAKING  Pertinent Labs & Imaging studies reviewed. (See chart for details)    7:43 PM  Spoke w Behavioral health -after his bedside evaluation of the patient he also believes the patient does not require cycle legal hold at this time, he has been referred to [facility] and I have spoken with social work to give the patient a bypass he slept in the shelter last night he would rather go to [facility] inpatient and I discussed that if he would like to go to be evaluated he can go to [place] he can go to [facility] is been given multiple resources multiple times.  He is agitated but cooperative and is verbalizing and showing signs that he understands how the city works and how he can get his resources as he is taking buses here and to and from the shelter.  He is not endorsing suicidal or homicidal ideation and can be discharged    /76   Pulse 78   Temp 36.7 °C (98.1 °F) (Temporal)   Resp 18   Wt 71.7 kg (158 lb)   SpO2 99%   BMI 21.43 kg/m²      The patient will return for new or worsening symptoms and is stable at the time of discharge.    The patient is referred to a primary physician for blood pressure management, diabetic screening, and for all other preventative health concerns.    DISPOSITION:  Patient will be discharged home in stable condition.    FOLLOW UP:  [facility] - Psych (CCM POS)  [address] [address] [phone]  Go to       [facility], Emergency Dept  [address] [address] [phone]    If symptoms worsen      OUTPATIENT MEDICATIONS:  There are no discharge medications for this patient.      FINAL IMPRESSION  1. Auditory hallucinations    2. Agitation         Electronically signed by: Miesha Perez, 12/9/2018 5:56 PM    This dictation has been created using voice recognition software and/or scribes. The accuracy of the dictation is limited by the abilities of the software and the expertise of the scribes. I expect there may be some errors of grammar and possibly content. I made every attempt to manually correct the errors within my dictation. However, errors related to voice recognition software and/or scribes may still exist and should be interpreted within the appropriate context.

## 2018-12-10 NOTE — ED NOTES
Patient cleared for discharge. Bus pass provided. Patient escorted out by security. All safety maintained.

## 2019-03-09 ENCOUNTER — HOSPITAL ENCOUNTER (EMERGENCY)
Facility: MEDICAL CENTER | Age: 39
End: 2019-03-11
Attending: EMERGENCY MEDICINE
Payer: MEDICARE

## 2019-03-09 DIAGNOSIS — F20.9 SCHIZOPHRENIA, UNSPECIFIED TYPE (HCC): ICD-10-CM

## 2019-03-09 LAB
AMPHET UR QL SCN: NEGATIVE
BARBITURATES UR QL SCN: NEGATIVE
BENZODIAZ UR QL SCN: NEGATIVE
BZE UR QL SCN: NEGATIVE
CANNABINOIDS UR QL SCN: NEGATIVE
ETHANOL BLD-MCNC: 0 G/DL
METHADONE UR QL SCN: NEGATIVE
OPIATES UR QL SCN: NEGATIVE
OXYCODONE UR QL SCN: NEGATIVE
PCP UR QL SCN: NEGATIVE
PROPOXYPH UR QL SCN: NEGATIVE

## 2019-03-09 PROCEDURE — 99285 EMERGENCY DEPT VISIT HI MDM: CPT

## 2019-03-09 PROCEDURE — 302970 POC BREATHALIZER: Performed by: EMERGENCY MEDICINE

## 2019-03-09 PROCEDURE — 36415 COLL VENOUS BLD VENIPUNCTURE: CPT

## 2019-03-09 PROCEDURE — 80307 DRUG TEST PRSMV CHEM ANLYZR: CPT

## 2019-03-09 PROCEDURE — 90791 PSYCH DIAGNOSTIC EVALUATION: CPT

## 2019-03-09 NOTE — ED NOTES
"Pt refusing to blow on breathalyzer and states \" I smoke cigarettes, it's not okay and I have COPD I will not blow on that stupid machine. I know the trick to the machine too so draw my blood please\".   "

## 2019-03-09 NOTE — ED NOTES
Pt to G29H with steady gait. Assumed care. Pt changing out of clothes and into hospital gown. Pt provided urine sample.

## 2019-03-09 NOTE — CONSULTS
"[facility]   TRIAGE ASSESSMENT    Name: Dallin Lowry  MRN: [id number]  : 1980  Age: 38 y.o.  Date of assessment: 3/9/2019  PCP: Pcp Pt States None  Persons in attendance: Patient    CHIEF COMPLAINT/PRESENTING ISSUE (as stated by Alen Zamarripa): The patient presents as a 38 year-old male, reporting \"I'm having a schizophrenic attack man\". The patient denies SI/HI at this time, noting however that he feels \"unsafe\" in the absence of a secured environment and has a history of suicidal ideations historically. The patient was observed as mumbling to himself in his bed immediately following the assessment in what appeared to be a 'conversation' the patient was having with himself, where he was talking back and forth with himself. The patient reports having auditory hallucinations, noting that he was diagnosed previously with Schizophrenia and Major Depressive DO. Additionally, the patient noted he has a history of substance use, relating over 5+ years of methamphetamine use historically. It should be noted here that the patient tested negative for both alcohol and other drugs. The patient was unable to recall what medications he was on, stating that he was not taking any at the time of assessment. The patient presented as friendly and cooperative throughout the duration of the evaluation.    Chief Complaint   Patient presents with   • Psych Eval   • Suicidal Ideation        CURRENT LIVING SITUATION/SOCIAL SUPPORT: The patient reports he is currently homeless, with limited supports, at the time of assessment.     BEHAVIORAL HEALTH TREATMENT HISTORY  Does patient/parent report a history of prior behavioral health treatment for patient?   Yes:    Dates Level of Care Facilty/Provider Diagnosis/Problem Medications   2019 OP [facility] Schizophrenia, MDD Unknown   [id number], approximately Inpatient/OP [facility] Schizophrenia, MDD Unknown                                                         "         SAFETY ASSESSMENT - SELF  Does patient acknowledge current or past symptoms of dangerousness to self? yes  Does parent/significant other report patient has current or past symptoms of dangerousness to self? N\A  Does presenting problem suggest symptoms of dangerousness to self? Yes: The patient reports no active SI at this time, however the patient presents as a danger to himself due to active psychosis.   Past Current    Suicidal Thoughts: [x]  []    Suicidal Plans: [x]  []    Suicidal Intent: [x]  []    Suicide Attempts: []  []    Self-Injury []  []        History of suicide by family member: no  History of suicide by friend/significant other: no  Recent change in frequency/specificity/intensity of suicidal thoughts or self-harm behavior? no  Current access to firearms, medications, or other identified means of suicide/self-harm? no  If yes, willing to restrict access to means of suicide/self-harm? yes -   Protective factors present:  Willing to address in treatment    SAFETY ASSESSMENT - OTHERS  Does patient acknowledge current or past symptoms of aggressive behavior or risk to others? no  Does parent/significant other report patient has current or past symptoms of aggressive behavior or risk to others?  N\A  Does presenting problem suggest symptoms of dangerousness to others? No    Crisis Safety Plan completed and copy given to patient? N\A; patient is not suicidal at this time, however he does pose as a risk of danger to himself given his noted mental illness.    ABUSE/NEGLECT SCREENING  Does patient report feeling “unsafe” in his/her home, or afraid of anyone?  no  Does patient report any history of physical, sexual, or emotional abuse?  no  Does parent or significant other report any of the above? N\A  Is there evidence of neglect by self?  no  Is there evidence of neglect by a caregiver? no  Does the patient/parent report any history of CPS/APS/police involvement related to suspected abuse/neglect or  domestic violence? no  Based on the information provided during the current assessment, is a mandated report of suspected abuse/neglect being made?  No    SUBSTANCE USE SCREENING  Yes:  Srinivasan all substances used in the past 30 days:    UDS results: Negative  Breathalyzer results: 0.00    What consequences does the patient associate with any of the above substance use and or addictive behaviors? None    Risk factors for detox (check all that apply):  []  Seizures   []  Diaphoretic (sweating)   []  Tremors   []  Hallucinations   []  Increased blood pressure   []  Decreased blood pressure   []  Other   []  None      [] Patient education on risk factors for detoxification and instructed to return to ER as needed.      MENTAL STATUS   Participation: Active verbal participation  Grooming: Disheveled  Orientation: Alert and Evidence of hallucinations present  Behavior: Tense  Eye contact: Limited  Mood: Euthymic  Affect: Blunted and Congruent with content  Thought process: Tangential  Thought content: Scattered, multiple themes while talking with self.  Speech: Rate within normal limits and Volume within normal limits  Perception: Evidence of auditory hallucination  Memory:  Poor memory for chronology of events  Insight: Poor  Judgment:  Poor  Other:    Collateral information:   Source:  [] Significant other present in person:   [] Significant other by telephone  [] [facility]   [x] [facility] Nursing Staff  [x] [facility]  [] Other:        CLINICAL IMPRESSIONS:  Primary:  Schizophrenia  Secondary:  Major Depressive DO       IDENTIFIED NEEDS/PLAN:  [Trigger DISPOSITION list for any items marked]    [x]  Imminent safety risk - self [] Imminent safety risk - others   []  Acute substance withdrawal [x]  Psychosis/Impaired reality testing   []  Mood/anxiety []  Substance use/Addictive behavior   [x]  Maladaptive behaviro []  Parent/child conflict   []  Family/Couples conflict []  Biomedical   [x]  Housing []   Financial   []   Legal  Occupational/Educational   []  Domestic violence []  Other:     Disposition: Consulted with Dr. Lorenz. Patient presents as a danger to self and has been placed on a legal hold accordingly. Patient awaiting placement into a psychiatric facility.    Does patient express agreement with the above plan? yes    Referral appointment(s) scheduled? no    Alert team only:   I have discussed findings and recommendations with Dr. Lorenz who is in agreement with these recommendations.     Referral information sent to the following community providers :    If applicable : Referred  to : Mame for legal hold follow up at (time): 5:20AM      AZEB Lakhani  3/9/2019

## 2019-03-09 NOTE — ED NOTES
Pt  Sitting up in bed talking to himself. Security searching his belongings at this time. Sitter at bedside.

## 2019-03-09 NOTE — ED NOTES
"Med rec complete per pt at bedside  Pt denies taking any medications currently  Pt reports that he has tried many different \"knock off\" medications (various psych medications) that have not worked for him, so he has not had medications in quite some time  Pt cannot state how long it has been   Pt cannot provide any other information regarding what medications he has taken or what pharmacy he may have used   "

## 2019-03-09 NOTE — ED NOTES
"Break RN.  Pt requests \" a meal, I just want a meal, no snacks. \"  Pt offered multiple microwave meals, requests mac and cheese.   "
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
## 2019-03-09 NOTE — DISCHARGE PLANNING
Social Work Student    Referral: Legal Hold    Intervention: Legal Hold Paperwork given to SW by Renard Smith    Legal Hold Initiated: Date: 3/9/19 Time: 0505    Patient’s Insurance Listed on Face Sheet: Medicare/[employer]    Referrals sent to: [facility], [facility], [place], [facility]    This referral contains the following information:  1) Face sheet __X__  2) Page 1 and Page 2 of Legal Hold _X___  3) Alert Team Assessment/Psych Assessment ___X_  4) Head to toe physical exam _X___  5) Urine Drug Screen _X___  6) Blood Alcohol _X___  7) Vital signs __X__  8) Pregnancy test when applicable _NA__  9) Medications list __X__    Plan: Patient will transfer to mental health facility once acceptance is obtained    Reviewed by MALAIKA Celaya

## 2019-03-09 NOTE — ED NOTES
"Patient moved to room 29.  Awake and having earnest conversation with himself.  Asks for \"something proper\" to eat, given box lunch.    "
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
## 2019-03-10 LAB — POC BREATHALIZER: 0 PERCENT (ref 0–0.01)

## 2019-03-10 NOTE — DISCHARGE PLANNING
MSW received call from Jenny at [facility]. Pt is delcined due to being out of Medicare days.

## 2019-03-10 NOTE — DISCHARGE PLANNING
Alert Team:  Met with patient, bizarre behaviors of standing at the sink  Letting water run over 1 hand.  Patient states that he doesn't want to talk with me.  Patient acting verbally aggressive towards writer.  Staff reports that he has been acting this way and that is security is around he is less aggressive.  Will attempted to touch base with him again this shift.

## 2019-03-10 NOTE — PROGRESS NOTES
Patient's home medications have been reviewed by the pharmacy team.     Past Medical History:   Diagnosis Date   • Auditory hallucinations    • Psychiatric disorder    • Schizophrenia ([facility]) 2001       Patient's Medications    No medications on file          A:  Medications do not appear to be contributing to current complaints as patient denies taking any prescription/OTC medications.     P:    No recommendations at this time. No home medications require restart at this time. Requests for any additional medications to be addressed with current ERP as needed.    Nicole Cline, KarenD

## 2019-03-10 NOTE — DISCHARGE PLANNING
Alert team  note:  38 year old male admitted this AM, 3/9/19, legal hold, SI; Medicare insurance plan; calm behaviors; pt responding to internal stimuli; internally preoccupied; tangential; cont to endorse SI; alteration in thoughts, mood, cont; pt to transfer to community inpt MH facilty [facility]

## 2019-03-10 NOTE — ED NOTES
Received report and assumed care of pt. 1:1 sitter outside of room watching pt at al times. Assisted with changing TV channel.

## 2019-03-10 NOTE — ED NOTES
Assuming care of patient. Report received from Pauly. Patient resting in room eating dinner. NAD. BIRGITS. Sitter outside room.

## 2019-03-10 NOTE — ED NOTES
Patient sleeping in room, respirations regular, even, and unlabored. Sitter in position for direct obs. Patient has no complaints at this time.

## 2019-03-10 NOTE — ED PROVIDER NOTES
10:22 AM    Subjective:  Patient is awaiting transfer to psychiatric facility.  He has no complaints. Has been eating and drinking has been receiving medications as prescribed.    Objective: /88   Pulse 60   Temp 36.5 °C (97.7 °F) (Oral)   Resp 16   Ht 1.829 m (6')   Wt 67.9 kg (149 lb 11.1 oz)   SpO2 98%   BMI 20.30 kg/m² .  Calm and cooperative. Generally nontoxic. No respiratory distress. No abdominal tenderness. No skin rash. Extremities unremarkable.    Laboratory data was reviewed.    Assesment/Plan:   1.  Schizophrenia  2.  Suicidal ideation      Electronically signed by: David Lorenz, 3/10/2019 10:22 AM

## 2019-03-11 VITALS
DIASTOLIC BLOOD PRESSURE: 76 MMHG | WEIGHT: 149.69 LBS | HEART RATE: 62 BPM | TEMPERATURE: 97.6 F | HEIGHT: 72 IN | OXYGEN SATURATION: 99 % | BODY MASS INDEX: 20.28 KG/M2 | RESPIRATION RATE: 18 BRPM | SYSTOLIC BLOOD PRESSURE: 122 MMHG

## 2019-03-11 PROCEDURE — 99284 EMERGENCY DEPT VISIT MOD MDM: CPT | Performed by: PSYCHIATRY & NEUROLOGY

## 2019-03-11 ASSESSMENT — ENCOUNTER SYMPTOMS
FEVER: 0
SHORTNESS OF BREATH: 0
SEIZURES: 0
CHILLS: 0

## 2019-03-11 NOTE — ED NOTES
Pt refused DC vitals. Pt ambulated stable/steady gait out through triage after being given his personal belongings for DC

## 2019-03-11 NOTE — PSYCHIATRY
"PSYCHIATRIC INTAKE EVALUATION     *Reason for admission: 38 year old male admitted for suicidal ideation                   *Reason for consult: psychiatric evaluation - suicidal ideation   *Requesting Physician/APN: David Lorenz M.D.         Supervising Psychiatrist: Dr. Sotelo           Legal Hold on admission: On Hold           *Chief Complaint:     \"I don't want to talk about it\"    *HPI (includes Psychiatric ROS):       38 year old male admitted with suicidal ideation. Per chart review, patient noted fear for safety outside of hospitalization  with concern that he may harm himself. He was unable to provide plan or further detail about suicidal ideation to [employer] team or ED physician. Patient noted to be calm and cooperative overnight. Concern on admission for response to internal stimuli and presence of auditory hallucinations noted as well.     At time of interview, patient calm and moderately cooperative with interview. He states that he is unwilling to speak about his past as he would like to \"focus on now and what will happen after this\". He has a noted history of schizophrenia and Major Depressive Disorder. He states that he has not been following outpatient and cannot recall previous hospitalizations. He has a documented allergy to Zyprexa, Geodon, Risperidone, Haldol and other related drugs. He cannot recall a reaction to medications, noting he may have had a dystonic reaction at one point. He denies requiring hospitalization due to drug reaction. He states that he had suicidal ideation and homicidal ideation upon hospitalization, but denies these thoughts within the last 24 hours. He states that he was previously having auditory hallucinations as well of voices that he could not clearly make out. He denies command hallucinations. He denies this at this time as well. He states that he has been sleeping and eating well upon hospitalization. He declines to explicitly state his mood, but states he " "overall feels well and primarily would like to know if he will be going to inpatient psychiatric hospitalization. He declines medications as he feels \"things are better now, I don't need it right now.\" He states he will not take medications outpatient as he feels this has interfered with his ability to work in the past, he is unwilling to further participate in conversation concerning benefits and risks of medications. He states he will go to the shelter if discharged, he has been at the shelter previously. He has previously worked at [employer], states he has a new job lined up that he feels will pay better. He appropriately expresses that he will present to the ED or call 911 if he fears for his safety. Notes indicate patient has previously sought help appropriately at times of distress.     Depression: unwilling to state mood history denies poor sleep, poor appetite, poor energy  Anxiety: denies  Psychosis: denies current auditory hallucinations, denies current visual hallucinations, denies paranoid delusions, denies ideas of reference    *Medical Review Of Symptoms (not dx conditions):   Review of Systems   Constitutional: Negative for chills and fever.   Respiratory: Negative for shortness of breath.    Cardiovascular: Negative for chest pain.   Neurological: Negative for seizures.       All other systems reviewed and are negative.       *Psychiatric Examination:   Vitals:   Vitals:    03/11/19 0615   BP: 122/76   Pulse: 62   Resp: 18   Temp:    SpO2: 99%       General Appearance: 38 year old male appropriate to stated age laying in bed with sheets over himself, calm   Abnormal Movements: no psychomotor agitation or retardation noted  Gait and Posture: gait not formally observed  Speech: regular rate, regular volume, no slurring of speech, no stuttering  Though Process: linear, logical, goal directed  Associations: no loose association  Abnormal or Psychotic Thoughts: no delusions noted on interview, does " "not appear to be responding to internal stimuli, denies auditory hallucinations, denies visual hallucinations  Judgement and Insight: limited/fair   Orientation: oriented to self, place, and situation  Recent and Remote Memory: globally in tact  Attention Span and Concentration: appropriately attentive with good concentration  Language: no word finding difficulties, no expressive or receptive aphasia  Fund of Knowledge: fair  Mood and Affect: \"good now, I don't want to talk about before\", full range of emotions  SI/HI: denies/denies     *PAST MEDICAL/PSYCH/FAMILY/SOCIAL(as reported by patient):         *medical hx:        TBI: denies - states he is unclear but does not believe so  SZ: denies  Stroke: denies  Past Medical History:   Diagnosis Date   • Auditory hallucinations    • Psychiatric disorder    • Schizophrenia (HCC) 2001     History reviewed. No pertinent surgical history.     *psychiatric hx:   SAs: states he cannot recall - chart review indicates no prior suicide attempts  Guns: none   Hospitalizations: yes - states he cannot recall last hospitalization  Med Hx: Does not recall names of medications- notes that he took medication previously but this interfered with his ability to work and discontinued. Has not been on medications in several years. Chart review indicates previous trial of Seroquel. Discontinued use upon discharge. Documented allergy to Zyprexa, Geodon, Risperdal, Haldol - patient denies requiring hospitalization due to medication side effect and is unable to state reaction that occurred. He believes he may have had symptoms consistent with dystonic reaction but this is unclear from patient as well.     *family Psych hx:  Denies       *social hx:  Alcohol: Denies   Drugs: Denies in last week, states he does not know last use. History of methamphetamine use previously. Chart review indicates positive UDS in Dec 2016. UDS has been noted to be negative on subsequent admissions and was negative " "on this admission as well.     *MEDICAL HX: labs, MARS, medications, etc were reviewed. Only those findings of potential interest to psychiatry are noted below:    *Current Medical issues:  Denies     *Allergies:  Allergies   Allergen Reactions   • Zyprexa      \"jaw lock up\"   • Geodon [ ]      \"I get lock jaw\"   • Haldol [Haloperidol]      \"I get lock jaw\"   • Other Drug      Pt reports having \"adverse reactions\" to \"knock off medications\" (various psych medications)  Pt reports reactions such as \"locked jaw\" and \"tongue rolling down throat\"   • Risperidone And Related      \"I get lock jaw\"     *Current Medications:  No current facility-administered medications for this encounter.   No current outpatient prescriptions on file.  *EKG: none available   *Imaging: none available   EEG: none available       *Labs:  No results for input(s): WBC, RBC, HEMOGLOBIN, HEMATOCRIT, MCV, MCH, RDW, PLATELETCT, MPV, NEUTSPOLYS, LYMPHOCYTES, MONOCYTES, EOSINOPHILS, BASOPHILS, RBCMORPHOLO in the last 72 hours.  Lab Results   Component Value Date/Time    SODIUM 136 12/04/2016 06:46 PM    POTASSIUM 3.5 (L) 12/04/2016 06:46 PM    CHLORIDE 98 12/04/2016 06:46 PM    CO2 27 12/04/2016 06:46 PM    GLUCOSE 80 12/04/2016 06:46 PM    BUN 17 12/04/2016 06:46 PM    CREATININE 0.87 12/04/2016 06:46 PM           Lab Results  Component Value Date/Time   BREATHALIZER 0.00 03/10/2019 0702     No components found for: BLOODALCOHOL     Lab Results  Component Value Date/Time   AMPHUR Negative 03/09/2019 0331   BARBSURINE Negative 03/09/2019 0331   BENZODIAZU Negative 03/09/2019 0331   COCAINEMET Negative 03/09/2019 0331   METHADONE Negative 03/09/2019 0331   ECSTASY Negative 12/04/2016 1740   OPIATES Negative 03/09/2019 0331   OXYCODN Negative 03/09/2019 0331   PCPURINE Negative 03/09/2019 0331   PROPOXY Negative 03/09/2019 0331   CANNABINOID Negative 03/09/2019 0331     No results found for: TSH, FREET4      *ASSESSMENT/PLAN:  1.  Unspecified Psychotic " Disorder - R/O Schizophrenia (by history), R/O Substance Induced Psychotic Disorder  - Patient refusing medications and declines outpatient prescription of medications as well as outpatient follow up as he feels medications have previously interfered with his ability to work. Denies current auditory or visual hallucinations, does not appear to be responding to internal stimuli at this time.           2. Unspecified Depressive Disorder - R/O Major Depressive Disorder (by history)  -    Denies current suicidal ideation, reports he feels safe outpatient, appropriately future oriented with plan to return to shelter and return to work. Declines outpatient follow up and states he will discontinue medications upon discharge and will not take medications if discharged from inpatient psychiatric facility as well at this time. He states he will return to the ED or 911 if required, previous admissions to ED indicates good insight and help seeking behavior at times of distress.         Legal hold: Discontinued - patient does not present as risk of harm to self or others at this time and has appropriate plans for self care upon discharge     *Medication risk/benefit/expections discussed  *Signing off  *Thank you for the consult

## 2019-03-11 NOTE — ED PROVIDER NOTES
11:12 AM    Subjective: Patient denies any medical complaints.    Patient was seen by psychiatry today and his legal hold was lifted.      Objective: /76   Pulse 62   Temp 36.4 °C (97.6 °F) (Oral)   Resp 18   Ht 1.829 m (6')   Wt 67.9 kg (149 lb 11.1 oz)   SpO2 99%   BMI 20.30 kg/m² .  Calm and cooperative. Generally nontoxic. No respiratory distress. No abdominal tenderness. No skin rash. Extremities unremarkable.    Assesment/Plan:   1.  Schizophrenia-stabilized.  Referred to the [facility] clinic for primary medical care.  Follow-up with [facility].      Electronically signed by: David Lorenz, 3/11/2019 11:12 AM

## 2019-11-19 ENCOUNTER — NON-PROVIDER VISIT (OUTPATIENT)
Dept: OCCUPATIONAL MEDICINE | Facility: CLINIC | Age: 39
End: 2019-11-19

## 2019-11-19 DIAGNOSIS — Z02.1 PRE-EMPLOYMENT DRUG SCREENING: ICD-10-CM

## 2019-11-19 LAB
AMP AMPHETAMINE: NORMAL
BAR BARBITURATES: NORMAL
BZO BENZODIAZEPINES: NORMAL
COC COCAINE: NORMAL
INT CON NEG: NORMAL
INT CON POS: NORMAL
MDMA ECSTASY: NORMAL
MET METHAMPHETAMINES: NORMAL
MTD METHADONE: NORMAL
OPI OPIATES: NORMAL
OXY OXYCODONE: NORMAL
PCP PHENCYCLIDINE: NORMAL
POC URINE DRUG SCREEN OCDRS: NORMAL
THC: NORMAL

## 2019-11-19 PROCEDURE — 80305 DRUG TEST PRSMV DIR OPT OBS: CPT | Performed by: PREVENTIVE MEDICINE

## 2020-09-15 NOTE — ED NOTES
Group Therapy Note    Date: 9/15/2020    Group Start Time: 1000  Group End Time: 9569  Group Topic: Psychotherapy    [facility]    [address], [place]        Group Therapy Note    Attendees: 4/9        Pt denied 1:1. Despite staff encouragement, pt denied 10:00am psychotherapy group.           Signature:  [address], [place] Pt resting in bed at this time, no distress noted.

## 2020-10-24 ENCOUNTER — HOSPITAL ENCOUNTER (EMERGENCY)
Facility: MEDICAL CENTER | Age: 40
End: 2020-10-24
Attending: EMERGENCY MEDICINE
Payer: MEDICARE

## 2020-10-24 VITALS
HEIGHT: 72 IN | OXYGEN SATURATION: 98 % | TEMPERATURE: 98.9 F | DIASTOLIC BLOOD PRESSURE: 83 MMHG | HEART RATE: 89 BPM | SYSTOLIC BLOOD PRESSURE: 148 MMHG | RESPIRATION RATE: 18 BRPM | BODY MASS INDEX: 20.3 KG/M2

## 2020-10-24 DIAGNOSIS — F22 PARANOIA (HCC): ICD-10-CM

## 2020-10-24 PROCEDURE — 302970 POC BREATHALIZER: Performed by: EMERGENCY MEDICINE

## 2020-10-24 PROCEDURE — 80307 DRUG TEST PRSMV CHEM ANLYZR: CPT

## 2020-10-24 PROCEDURE — 99284 EMERGENCY DEPT VISIT MOD MDM: CPT

## 2020-10-24 RX ORDER — QUETIAPINE FUMARATE 200 MG/1
200 TABLET, FILM COATED ORAL 2 TIMES DAILY
Status: SHIPPED | COMMUNITY
End: 2020-10-24

## 2020-10-24 NOTE — ED PROVIDER NOTES
"ED Provider Note    CHIEF COMPLAINT  Chief Complaint   Patient presents with   • Hallucinations       HPI  Dallin Lowry is a 40 y.o. male here for evaluation of paranoia.  Patient states that he has history of paranoid schizophrenia, and feels like \"people are watching him while he is here in town.  The pt has no si or hi at this time. He has no cp, no sob, and no vomiting or fever. He wants to 'try and get some meds, even though none of them work.'      ROS;  Please see HPI  O/W negative     PAST MEDICAL HISTORY   has a past medical history of Auditory hallucinations, Psychiatric disorder, and Schizophrenia ([facility]) (2001).    SOCIAL HISTORY  Social History     Tobacco Use   • Smoking status: Current Every Day Smoker     Packs/day: 0.50     Types: Cigarettes   • Smokeless tobacco: Never Used   Substance and Sexual Activity   • Alcohol use: No   • Drug use: No     Types: Inhaled     Comment:  meth    • Sexual activity: Not on file       SURGICAL HISTORY  patient denies any surgical history    CURRENT MEDICATIONS  Home Medications     Reviewed by Kesli Miller (Pharmacy Tech) on 10/24/20 at 1519  Med List Status: Complete   Medication Last Dose Status        Patient Kevin Taking any Medications                       ALLERGIES  Allergies   Allergen Reactions   • Zyprexa      \"jaw lock up\"   • Geodon [ ]      \"I get lock jaw\"   • Haldol [Haloperidol]      \"I get lock jaw\"   • Other Drug      Pt reports having \"adverse reactions\" to \"knock off medications\" (various psych medications)  Pt reports reactions such as \"locked jaw\" and \"tongue rolling down throat\"   • Paliperidone    • Risperdal  [Benzoic Acid-Risperidone]      \"jaw locks up\"   • Risperidone And Related      \"I get lock jaw\"   • Ziprasidone      Other reaction(s): Other  mouth locks up states patient       REVIEW OF SYSTEMS  See HPI for further details. Review of systems as above, otherwise all other systems are negative.     PHYSICAL EXAM  VITAL " SIGNS: /100   Pulse 100   Temp 37.2 °C (98.9 °F) (Temporal)   Resp 16   Ht 1.829 m (6')   SpO2 96%   BMI 20.30 kg/m²     Constitutional: Well developed, well nourished. No acute distress.  HEENT: Normocephalic, atraumatic. MMM  Neck: Supple, Full range of motion   Chest/Pulmonary:  No respiratory distress.  Equal expansion   Musculoskeletal: No deformity, no edema, neurovascular intact.   Neuro: Clear speech, appropriate, cooperative, cranial nerves II-XII grossly intact.  Psych: Normal mood and affect      PROCEDURES     MEDICAL RECORD  I have reviewed patient's medical record and pertinent results are listed.    COURSE & MEDICAL DECISION MAKING  I have reviewed any medical record information, laboratory studies and radiographic results as noted above.    3:33 PM  The pt has no si/hi.  He will be given resources for outpatient care.     I you have had any blood pressure issues while here in the emergency department, please see your doctor for a further evaluation or work up.    Differential diagnoses include but not limited to: paranoia, si/hi, psychosis.     This patient presents with paranoia  .  At this time, I have counseled the patient/family regarding their medications, pain control, and follow up.  They will continue their medications, if any, as prescribed.  They will return immediately for any worsening symptoms and/or any other medical concerns.  They will see their doctor, or contact the doctor provided, in 1-2 days for follow up.       FINAL IMPRESSION  Paranoia     Electronically signed by: Vel Harper D.O., 10/24/2020 3:27 PM

## 2020-10-24 NOTE — ED NOTES
Pt unwilling to discuss much of his medical history. Was recently released from [facility]. Wants inpatient psychiatric treatment.

## 2020-10-24 NOTE — ED NOTES
Social work unavailable. Pt given community resources and discharge instructions, upset we are not admitting him. RN answered questions. VSS. Pt ambulated steadily out to ER lobby to bus stop.

## 2020-10-24 NOTE — ED NOTES
Med rec updated and complete  Allergies reviewed  Pt reports that he was taking SEROQUEL 200MG, it was not working for him he can't tell me how long it's been since he took this medications last, per pt reports that [facility] gave him a dose yesterday (10/22/2020)  Pt is not sure what pharmacy to go too.  Pt reports no vitamins or OTC's.  Pt reports no antibiotics in the last 2 weeks

## 2020-10-24 NOTE — ED TRIAGE NOTES
Pt reports auditory hallucinations of people trying to harm him. Hx of schizophrenia. Denies SI/HI, states has been taking prescribed seroquel.    Pt unknown if any covid exposure, denies symptoms.

## 2021-08-26 NOTE — ED NOTES
Pt BIB [facility], pt found running on freeway, officer attempted to assist pt, pt ran and then raised hands, officer attempted to drop pt off at [facility] and pt requested to come to hospital because he reports a hx of schizophrenia and he is not taking his meds.  Pt to room 33. Bubba ARIAS and Rachel from Alert team at bedside   [phone]

## 2021-12-07 ENCOUNTER — HOSPITAL ENCOUNTER (EMERGENCY)
Facility: MEDICAL CENTER | Age: 41
End: 2021-12-08
Attending: EMERGENCY MEDICINE
Payer: COMMERCIAL

## 2021-12-07 DIAGNOSIS — R44.3 HALLUCINATIONS: ICD-10-CM

## 2021-12-07 PROCEDURE — 99283 EMERGENCY DEPT VISIT LOW MDM: CPT

## 2021-12-08 VITALS
TEMPERATURE: 96.5 F | OXYGEN SATURATION: 95 % | BODY MASS INDEX: 21.26 KG/M2 | SYSTOLIC BLOOD PRESSURE: 145 MMHG | RESPIRATION RATE: 16 BRPM | WEIGHT: 156.97 LBS | HEART RATE: 85 BPM | HEIGHT: 72 IN | DIASTOLIC BLOOD PRESSURE: 78 MMHG

## 2021-12-08 RX ORDER — QUETIAPINE FUMARATE 25 MG/1
50 TABLET, FILM COATED ORAL ONCE
Status: DISCONTINUED | OUTPATIENT
Start: 2021-12-08 | End: 2021-12-08 | Stop reason: HOSPADM

## 2021-12-08 NOTE — ED TRIAGE NOTES
"Dallin Lowry  41 y.o. M  Chief Complaint   Patient presents with   • Psych Eval     Patient states \"I'm hearing voices.\" Patient reports acidentally taking some crystal meth that he found in a warehouse in 2020 and now he his hearing voices. Patient states he his not taking his medications becuase he is now homeless. Patient denies SI.    • Hallucinations     Visual and auditory      Vitals:    12/07/21 2133   BP: 152/87   Pulse: 77   Resp: 18   Temp: (Abnormal) 35.8 °C (96.4 °F)   SpO2: 96%     Triage process explained to patient, apologized for wait time, and returned to [facility].  Pt informed to notify staff of any change in condition.     "

## 2021-12-08 NOTE — DISCHARGE PLANNING
Alert Team:    Patient is a 40 y/o male with a diagnose history of schizophrenia; reports he is off his previously prescribed Seroquel and experiencing AH, intermittently command type. Patient is unsheltered with minimal social support. Speech is somewhat tangential but is goal directed. Patient given Seroquel 50 mg PO and provided with cab voucher #[id number] over to [facility] for Crisis Stabilization. Secure e-mailed ER interventions to facility.       Patient refused Seroquel medication and cab voucher; pt left ER.

## 2021-12-08 NOTE — ED NOTES
Pt amb to [facility] [address] with steady gait. Agree with triage note. Pt states the voices tell him to hurt himself. Chart up for ERP.

## 2021-12-08 NOTE — ED NOTES
Pt refused medication despite education. Pt denied taxi voucher. Pt amb steadily to lobby with all belongings

## 2021-12-08 NOTE — ED PROVIDER NOTES
"ED Provider Note    CHIEF COMPLAINT  Chief Complaint   Patient presents with   • Psych Eval     Patient states \"I'm hearing voices.\" Patient reports acidentally taking some crystal meth that he found in a warehouse in 2020 and now he his hearing voices. Patient states he his not taking his medications becuase he is now homeless. Patient denies SI.    • Hallucinations     Visual and auditory        HPI  Dallin Lowry is a 41 y.o. male who presents with reports of hallucinations.  No suicidal or homicidal ideation.  He states that he was just kicked out of a home in [place] earlier.  He has had longstanding psychiatric issues including a diagnosis of schizophrenia.  He takes Seroquel for this.  He states that he has misplaced his medications however.  No active medical complaints at this time.  No pain, nausea, vomiting, fever, recent illness.  He currently does not have a place to stay.    His primary concern seems to be to find housing currently.    REVIEW OF SYSTEMS  See HPI for further details. All other systems are negative.     PAST MEDICAL HISTORY   has a past medical history of Auditory hallucinations, Psychiatric disorder, and Schizophrenia ([facility]) (2001).    SOCIAL HISTORY  Social History     Tobacco Use   • Smoking status: Current Every Day Smoker     Packs/day: 0.50     Types: Cigarettes   • Smokeless tobacco: Never Used   Vaping Use   • Vaping Use: Never used   Substance and Sexual Activity   • Alcohol use: No   • Drug use: No     Types: Inhaled     Comment:  meth    • Sexual activity: Not on file       SURGICAL HISTORY  patient denies any surgical history    CURRENT MEDICATIONS  Home Medications    **Home medications have not yet been reviewed for this encounter**         ALLERGIES  Allergies   Allergen Reactions   • Zyprexa      \"jaw lock up\"   • Geodon [ ]      \"I get lock jaw\"   • Haldol [Haloperidol]      \"I get lock jaw\"   • Other Drug      Pt reports having \"adverse reactions\" to \"knock off medications\" " "(various psych medications)  Pt reports reactions such as \"locked jaw\" and \"tongue rolling down throat\"   • Paliperidone    • Risperdal  [Benzoic Acid-Risperidone]      \"jaw locks up\"   • Risperidone And Related      \"I get lock jaw\"   • Ziprasidone      Other reaction(s): Other  mouth locks up states patient       PHYSICAL EXAM  VITAL SIGNS: /87   Pulse 77   Temp (!) 35.8 °C (96.4 °F) (Temporal)   Resp 18   Ht 1.829 m (6')   Wt 71.2 kg (156 lb 15.5 oz)   SpO2 96%   BMI 21.29 kg/m²   Pulse ox interpretation: I interpret this pulse ox as normal.  Constitutional: Alert in no apparent distress.  HENT: No signs of trauma, Bilateral external ears normal, Nose normal.   Eyes: Pupils are equal and reactive, Conjunctiva normal, Non-icteric.   Neck: Normal range of motion, Supple, No stridor.   Cardiovascular: Regular rate and rhythm.   Thorax & Lungs: Normal breath sounds, No respiratory distress  Neurologic: Alert, Normal motor function, Normal sensory function, No focal deficits noted.   Psychiatric: Affect normal, Judgment normal, Mood normal.  Reports of hallucinations.      DIAGNOSTIC STUDIES / PROCEDURES      COURSE & MEDICAL DECISION MAKING    Medications - No data to display    Pertinent Labs & Imaging studies reviewed. (See chart for details)  41 y.o. male with a known history of schizophrenia and long standing history of intermittent hallucinations presenting with reports of active hallucinations.  He currently does not have a place to stay.  Housing appears to be his primary concern at this time.  No physical complaints.  Normal vital signs.      I asked the patient how we can help him here today.  I proposed sending him to [facility] and giving him his cervical medication and the patient agreed to this plan of care.  He does meet criteria for a legal hold.  Patient ultimately left the emergency department without further care.    The patient was instructed to follow-up with primary care physician for " further management.  To return immediately for any worsening symptoms or development of any other concerning signs or symptoms. The patient verbalizes understanding in their own words.    /78   Pulse 85   Temp 35.8 °C (96.5 °F) (Temporal)   Resp 16   Ht 1.829 m (6')   Wt 71.2 kg (156 lb 15.5 oz)   SpO2 95%   BMI 21.29 kg/m²     The patient was referred to primary care where they will receive further BP management.      [facility], Emergency Dept  [address] [place] [id number] [phone]    As needed, If symptoms worsen    Primary care doctor    Schedule an appointment as soon as possible for a visit         FINAL IMPRESSION  1. Hallucinations            Electronically signed by: Ricardo Parra M.D., 12/7/2021 11:44 PM

## 2022-05-18 ENCOUNTER — HOSPITAL ENCOUNTER (EMERGENCY)
Facility: MEDICAL CENTER | Age: 42
End: 2022-05-19
Attending: EMERGENCY MEDICINE
Payer: COMMERCIAL

## 2022-05-18 DIAGNOSIS — R44.3 HALLUCINATIONS: ICD-10-CM

## 2022-05-18 DIAGNOSIS — F20.89 OTHER SCHIZOPHRENIA (HCC): ICD-10-CM

## 2022-05-18 PROCEDURE — 302970 POC BREATHALIZER: Performed by: EMERGENCY MEDICINE

## 2022-05-18 PROCEDURE — 80307 DRUG TEST PRSMV CHEM ANLYZR: CPT

## 2022-05-18 PROCEDURE — 99284 EMERGENCY DEPT VISIT MOD MDM: CPT

## 2022-05-19 VITALS
DIASTOLIC BLOOD PRESSURE: 89 MMHG | HEIGHT: 72 IN | BODY MASS INDEX: 21.29 KG/M2 | OXYGEN SATURATION: 99 % | TEMPERATURE: 98.2 F | SYSTOLIC BLOOD PRESSURE: 131 MMHG | HEART RATE: 64 BPM | RESPIRATION RATE: 18 BRPM

## 2022-05-19 NOTE — ED NOTES
"Discharge paperwork handed to patient. Patient refusing to sign discharge paperwork, stating \"I have to go now.\" Belonging returned to patient. Patient dressed independently and ambulated without assist to exit.    "

## 2022-05-19 NOTE — ED NOTES
"This RN rounded on patient. Patient states he would like to be called Alen. Patient has food trash on floor at bedside, he stated he wanted it there until breakfast came. \"Do you know why 4-leaf clovers are clever? Because if you pull one, it's good luck. You didn't know that, did you?\" Patient then requested lights off in room. Will continue to round on patient.  "

## 2022-05-19 NOTE — ED NOTES
Patient sitting upright on gurney with eyes open & mask covering mouth & nose. Lights on in room. No needs at this time.

## 2022-05-19 NOTE — ED NOTES
Report received from [employer] [employer].    Patient is sitting calmly on gurney, lights on in room. No indications for sitter. Q15 checks in place.

## 2022-05-19 NOTE — ED PROVIDER NOTES
"ED Provider Note    CHIEF COMPLAINT  Chief Complaint   Patient presents with   • Hallucinations     \"I was trying to get to the [place] from [facility] and I just went bizarre seeing everyone on black tar heroin and i'm afraid of snakes and they're causing me trauma\"       HPI  Dallin Lowry is a 42 y.o. male who presents to the emergency department for auditory and visual hallucinations. Past medical history significant for paranoid schizophrenia. States that he is not currently on his medications. Hallucinations are getting worse. He states that he called 911 to report all of [place] to be using black tar heroin. States that people die secondary to heroin use. Denies any suicidal homicidal ideations. Denies illicit substance use personally.    REVIEW OF SYSTEMS  See HPI for further details. All other systems are negative.     PAST MEDICAL HISTORY   has a past medical history of Auditory hallucinations, Psychiatric disorder, and Schizophrenia ([facility]) (2001).    SOCIAL HISTORY  Social History     Tobacco Use   • Smoking status: Current Every Day Smoker     Packs/day: 0.50     Types: Cigarettes   • Smokeless tobacco: Never Used   Vaping Use   • Vaping Use: Never used   Substance and Sexual Activity   • Alcohol use: No   • Drug use: Not Currently     Types: Inhaled     Comment:  meth    • Sexual activity: Not on file       SURGICAL HISTORY  patient denies any surgical history    CURRENT MEDICATIONS  Home Medications     Reviewed by Kelsi Miller (Pharmacy Tech) on 05/18/22 at 1720  Med List Status: Unable to Obtain   Medication Last Dose Status        Patient Kevin Taking any Medications                       ALLERGIES  Allergies   Allergen Reactions   • Zyprexa      \"jaw lock up\"   • Geodon [ ]      \"I get lock jaw\"   • Haldol [Haloperidol]      \"I get lock jaw\"   • Other Drug      Pt reports having \"adverse reactions\" to \"knock off medications\" (various psych medications)  Pt reports reactions such as \"locked jaw\" " "and \"tongue rolling down throat\"   • Paliperidone    • Risperdal  [Benzoic Acid-Risperidone]      \"jaw locks up\"   • Risperidone And Related      \"I get lock jaw\"   • Ziprasidone      Other reaction(s): Other  mouth locks up states patient       PHYSICAL EXAM  VITAL SIGNS: BP (!) 154/100   Pulse 88   Temp 36.9 °C (98.4 °F) (Temporal)   Resp 18   Ht 1.829 m (6')   SpO2 98%   BMI 21.29 kg/m²  @ENMANUEL[[id number]::@  Pulse ox interpretation: I interpret this pulse ox as normal.  Constitutional: Alert in no apparent distress.  HENT: Normocephalic, Atraumatic, Bilateral external ears normal. Nose normal.   Eyes: Pupils are equal and reactive.  Heart: Regular rate and rythm, no murmurs.    Lungs: Clear to auscultation bilaterally.  Skin: Warm, Dry, No erythema, No rash.   Neurologic: Alert, Grossly non-focal.   Psychiatric: anxious and seemingly responding to internal stimuli.      Results for orders placed or performed during the hospital encounter of 05/18/22   URINE DRUG SCREEN (TRIAGE)   Result Value Ref Range    Amphetamines Urine Negative Negative    Barbiturates Negative Negative    Benzodiazepines Negative Negative    Cocaine Metabolite Negative Negative    Methadone Negative Negative    Opiates Negative Negative    Oxycodone Negative Negative    Phencyclidine -Pcp Negative Negative    Propoxyphene Negative Negative    Cannabinoid Metab Negative Negative   POC BREATHALIZER   Result Value Ref Range    POC Breathalizer 0.00 0.00 - 0.01 Percent         COURSE & MEDICAL DECISION MAKING  Pertinent Labs & Imaging studies reviewed. (See chart for details)  ED OBSERVATION  Start: 1800  FH: unable to obtain due to clinical condition    42-year-old male presented emergency room with decompensated schizophrenia. History as above. UDS and breathalyzer screening and negative. Patient requiring further behavioral health evaluation likely ongoing inpatient care. With chart review the patient has previously received Seroquel " without complication despite his multitude of allergies.    No significant clinical changes throughout his ER observation to this point and he will continue care under further ER observation until further psychiatric evaluation and definitive treatment plan/disposition and transfer can be completed.      FINAL IMPRESSION  1. Hallucinations               Electronically signed by: Myron Alcantara M.D., 5/18/2022 6:12 PM

## 2022-05-19 NOTE — ED NOTES
"Pt sitting up in bed, appears calm. When talking to patient, he does answer questions however he often goes off topic and very fixated on a job he had in 2020 and also fear of \"the heroin problem in this town.\"  "

## 2022-05-19 NOTE — ED NOTES
Pt sitting up in bed with no signs of distress. Provided the patient with a snack. Pt denies any needs at this time.

## 2022-05-19 NOTE — DISCHARGE PLANNING
[facility] ALERT TEAM DISCHARGE PLANNING NOTE    Date:  5/19/22  Patient Name:  Dallin Burger 42 y.o. - Discharge Planning  MRN:  [id number]   YOB: 1980  ADMISSION DATE:  5/18/2022      Writer forwarded transfer packet for inpatient psychiatric care to the following community providers:  [facility], [facility], [facility]   Items  included in the transfer packet:   ___x__Face Sheet   ___x__Pages 1 and 2 of completed legal hold   ___x__Alert Team/Psych Assessment   ___x__H&P   ___x__UDS   ___x__Blood Alcohol   ___x__Vital signs   ___n/a__Pregnancy Test (if applicable)   _____Medications List   _____Covid Screen

## 2022-05-19 NOTE — PROGRESS NOTES
ED Provider discharge summary    ED Observation Discharge Note    ED course: The patient was here in the ED.  He was seen divided by [facility] and initially the plan was to get him to a psychiatric hospital.  I spoke with him at length.  He states he wants to go to work.  He is having hallucinations but these are chronic and longstanding.  He denies suicidal homicidal ideation.  He has a place to live and he has resources.    I have reviewed the paperwork from last night the patient was apparently disorganized is pretty with it now.  The patient states he has to go to work.  He does not want to stay.  He seems to be able to care for himself and he does not appear to be a danger to himself.  I have reviewed his chart it seems like some of his disorganization is chronic.  I felt [facility] revisit this and they think he should be safe to be discharged.    Results for orders placed or performed during the hospital encounter of 05/18/22   URINE DRUG SCREEN (TRIAGE)   Result Value Ref Range    Amphetamines Urine Negative Negative    Barbiturates Negative Negative    Benzodiazepines Negative Negative    Cocaine Metabolite Negative Negative    Methadone Negative Negative    Opiates Negative Negative    Oxycodone Negative Negative    Phencyclidine -Pcp Negative Negative    Propoxyphene Negative Negative    Cannabinoid Metab Negative Negative   POC BREATHALIZER   Result Value Ref Range    POC Breathalizer 0.00 0.00 - 0.01 Percent        BP (!) 154/100   Pulse 88   Temp 36.9 °C (98.4 °F) (Temporal)   Resp 18   Ht 1.829 m (6')   SpO2 98%   BMI 21.29 kg/m² .    Constitutional: Awake and alert. Nontoxic  HENT:  Grossly normal  Eyes: Grossly normal  Neck: Normal range of motion  Cardiovascular: Normal heart rate   Thorax & Lungs: No respiratory distress  Abdomen: Nontender  Skin:  No pathologic rash.   Extremities: Well perfused  Psychiatric: Affect normal    Assessment/Plan:   1. Hallucinations     2. Other  schizophrenia (HCC)           Patient is discharged in satisfactory condition to home.    Discharged at 2:08 PM    Electronically signed by: Myron Merchant M.D., 5/19/2022 2:08 PM

## 2022-05-19 NOTE — PROGRESS NOTES
ED Provider Progress Note    ED Observation Note    ED course: Patient was signed out to me stable awaiting psychiatric evaluation.  He was reassessed this morning has no complaints.  He was seen by the alert team they recommend hospitalization.  Paperwork was completed.  He is stable pending transfer to psychiatric hospitalization.  I have asked the pharmacy to continue his outpatient medications.  Patient is aware of plan.    Results for orders placed or performed during the hospital encounter of 05/18/22   URINE DRUG SCREEN (TRIAGE)   Result Value Ref Range    Amphetamines Urine Negative Negative    Barbiturates Negative Negative    Benzodiazepines Negative Negative    Cocaine Metabolite Negative Negative    Methadone Negative Negative    Opiates Negative Negative    Oxycodone Negative Negative    Phencyclidine -Pcp Negative Negative    Propoxyphene Negative Negative    Cannabinoid Metab Negative Negative   POC BREATHALIZER   Result Value Ref Range    POC Breathalizer 0.00 0.00 - 0.01 Percent        BP (!) 154/100   Pulse 88   Temp 36.9 °C (98.4 °F) (Temporal)   Resp 18   Ht 1.829 m (6')   SpO2 98%   BMI 21.29 kg/m² .    Constitutional: Awake and alert. Nontoxic  HENT:  Grossly normal  Eyes: Grossly normal  Neck: Normal range of motion  Cardiovascular: Normal heart rate   Thorax & Lungs: No respiratory distress  Abdomen: Nontender  Skin:  No pathologic rash.   Extremities: Well perfused  Psychiatric: Affect normal    Assessment/Plan:   1. Hallucinations     2. Other schizophrenia (HCC)     2    Patient is stable pending transfer.        Electronically signed by: Myron Merchant M.D., 5/19/2022

## 2022-05-19 NOTE — DISCHARGE INSTRUCTIONS
Follow instructions of [facility].  Return for any new medical problems or complaints.  Return for continued hallucinations or if you want any other further medical assistance.  Follow-up with your doctor.

## 2022-05-19 NOTE — ED NOTES
Phone call received from Fide from Alert Team. Patient does not pose SI/HI risk. Continue q15 observation.

## 2022-05-19 NOTE — ED NOTES
"Patient was given packet of behavioral health resources which were faxed by María from Alert Team. Patient immediately dropped packet onto floor. Patient educated to review resources in anticipation of visit from Alert Team. No evidence of learning.    Lunch tray provided. Patient states \"Thank you very much, have a great day.\"  "

## 2022-05-22 ENCOUNTER — HOSPITAL ENCOUNTER (EMERGENCY)
Facility: MEDICAL CENTER | Age: 42
End: 2022-05-22
Attending: EMERGENCY MEDICINE
Payer: COMMERCIAL

## 2022-05-22 VITALS
WEIGHT: 156.97 LBS | RESPIRATION RATE: 14 BRPM | TEMPERATURE: 99.1 F | OXYGEN SATURATION: 95 % | SYSTOLIC BLOOD PRESSURE: 131 MMHG | DIASTOLIC BLOOD PRESSURE: 85 MMHG | HEIGHT: 72 IN | BODY MASS INDEX: 21.26 KG/M2 | HEART RATE: 58 BPM

## 2022-05-22 DIAGNOSIS — Z91.148 NONCOMPLIANCE WITH MEDICATION REGIMEN: ICD-10-CM

## 2022-05-22 DIAGNOSIS — F99 PSYCHIATRIC DISORDER: ICD-10-CM

## 2022-05-22 PROCEDURE — 700102 HCHG RX REV CODE 250 W/ 637 OVERRIDE(OP): Performed by: EMERGENCY MEDICINE

## 2022-05-22 PROCEDURE — 99284 EMERGENCY DEPT VISIT MOD MDM: CPT

## 2022-05-22 PROCEDURE — A9270 NON-COVERED ITEM OR SERVICE: HCPCS | Performed by: EMERGENCY MEDICINE

## 2022-05-22 RX ORDER — LORAZEPAM 1 MG/1
1 TABLET ORAL ONCE
Status: COMPLETED | OUTPATIENT
Start: 2022-05-22 | End: 2022-05-22

## 2022-05-22 RX ORDER — QUETIAPINE FUMARATE 100 MG/1
200 TABLET, FILM COATED ORAL NIGHTLY
Status: DISCONTINUED | OUTPATIENT
Start: 2022-05-22 | End: 2022-05-22 | Stop reason: HOSPADM

## 2022-05-22 RX ADMIN — LORAZEPAM 1 MG: 1 TABLET ORAL at 17:33

## 2022-05-22 RX ADMIN — QUETIAPINE FUMARATE 200 MG: 100 TABLET ORAL at 17:32

## 2022-05-22 NOTE — ED TRIAGE NOTES
Pt bib remsa from a street in [facility]. Pt lives at transitional housing facility/ [facility] in [place]. Pt has hx schizophrenia and is currently experiencing auditory hallucinations. Pt seen in ER x5d ago for same s/s. Pt states he takes Seroquel, but has not taken it since he was d/c'd from  ER x5d ago. Pt denies SI/HI.

## 2022-05-22 NOTE — ED NOTES
Med rec updated and complete, per pt   Allergies reviewed, per pt   Pt reports no prescription medications, OTC's, or vitamins.  Pt reports no antibiotics in the last 30 days.  Pt reports that he is not going to go to a pharmacy if he gets a prescription

## 2022-05-23 NOTE — ED NOTES
Pt woken up to eat, asked if he was ready to go home he verbalized yes.  Dr. Hagen in to see pt.  Pt not responding.

## 2022-05-23 NOTE — ED NOTES
Pt refuses to sign ppw.  Wakes up and we can talk to him but he won't move and refuses to do anything else.  Olamide, Dawn, RN notified by HORACE RN

## 2022-05-23 NOTE — ED PROVIDER NOTES
"ED Provider Note    CHIEF COMPLAINT  Chief Complaint   Patient presents with   • Hallucinations       HPI  Dallin Lowry is a 42 y.o. male who presents to the emergency department after requesting an ambulance stating that he is hearing voices.  The patient has a well-documented history of schizophrenia and auditory hallucinations and unfortunately he has not been taking his medications.  He was actually seen in the ER on May 19 and evaluated by behavioral health and encouraged to take his Seroquel but he tells me tonight he has not been doing so.  He wants to know if he can go to [facility] but he has not checked with that institution.  He does not wish to harm himself or others.    REVIEW OF SYSTEMS no fever or chills no nausea vomiting chest pain or difficulty breathing no recent trauma.  No wish to harm self or others.  All other systems negative    PAST MEDICAL HISTORY  Past Medical History:   Diagnosis Date   • Auditory hallucinations    • Psychiatric disorder    • Schizophrenia (HCC) 2001       FAMILY HISTORY  No family history on file.    SOCIAL HISTORY  Social History     Socioeconomic History   • Marital status: Single   Tobacco Use   • Smoking status: Current Every Day Smoker     Packs/day: 0.50     Types: Cigarettes   • Smokeless tobacco: Never Used   Vaping Use   • Vaping Use: Never used   Substance and Sexual Activity   • Alcohol use: No   • Drug use: Not Currently     Types: Inhaled     Comment:  meth        SURGICAL HISTORY  History reviewed. No pertinent surgical history.    CURRENT MEDICATIONS  Home Medications     Reviewed by Kelsi Miller (Pharmacy Tech) on 05/22/22 at 1640  Med List Status: Complete   Medication Last Dose Status        Patient Kevin Taking any Medications                       ALLERGIES  Allergies   Allergen Reactions   • Zyprexa      \"jaw lock up\"   • Geodon [ ]      \"I get lock jaw\"   • Haldol [Haloperidol]      \"I get lock jaw\"   • Other Drug      Pt " "reports having \"adverse reactions\" to \"knock off medications\" (various psych medications)  Pt reports reactions such as \"locked jaw\" and \"tongue rolling down throat\"   • Paliperidone      \"jaw locks up\"   • Risperdal  [Benzoic Acid-Risperidone]      \"jaw locks up\"   • Risperidone And Related      \"I get lock jaw\"   • Ziprasidone      Other reaction(s): Other  mouth locks up states patient       PHYSICAL EXAM  VITAL SIGNS: /85   Pulse (!) 58   Temp 37.3 °C (99.1 °F) (Temporal)   Resp 14   Ht 1.829 m (6')   Wt 71.2 kg (156 lb 15.5 oz)   SpO2 95%   BMI 21.29 kg/m²    Oxygen saturation is interpreted as adequate  Constitutional: Awake lucid ambulatory without difficulty  HENT: No sign of acute trauma to the head  Eyes: No erythema discharge or jaundice  Neck: No meningeal findings  Cardiovascular: Regular rate and rhythm  Lungs: Clear and equal with no difficulty breathing  Skin: Warm and dry  Musculoskeletal: No leg edema or calf tenderness  Neurologic: Awake verbal and moving all extremities without difficulty    CHART REVIEW  I reviewed the behavioral health notes from the May 19 as summarized above in the history of present illness    MEDICAL DECISION MAKING and DISPOSITION  In the emergency department the patient was given his baseline dose of Seroquel and also a dose of Ativan.  He slept for a couple of hours in the emergency department I have reevaluated him he has no wish to harm himself or others, there are no beds available at this time at [facility].  I have reviewed the case with our .  At this point in time the patient does not require emergency psychiatric hospitalization or emergency medical hospitalization.  I have tried to communicate clearly with him that he will feel a lot better if he is taking his medications as prescribed.  The patient will be discharged back to his shelter and I have encouraged him to follow-up with his mental health provider.    IMPRESSION  1. "  Schizophrenia, chronic  2.  Noncompliance with medications         Electronically signed by: Saqib Hagen M.D., 5/22/2022 11:48 PM

## 2022-05-23 NOTE — DISCHARGE PLANNING
"Anticipated Discharge Disposition: Shelter    Action: ER CM called for assist with pt plan for DC. Has been seen several times for non complaince with taking RX Seroquel. ER CM recommends eval of [facility] and Will provide Mental health Resources list, Homeless resources. He has apt listed but was last noted to be staying at [facility], he reports that [address] is connected to Shelter. He does not want to go back there, he wants to go to [facility]. Verbally confrontational and speaking to voices. He notes the voices \"how is it going and then Why is that\" when ER CM repeated it became agitate body language. ER CM agreeable to cab via [facility] to Shelter if desired if medically cleared. [facility] handout  Given. ER CM spoke with [facility] and they will not take as voluntary. He could dc to shelter and present voluntarily to [facility] if [facility] or MD deems appropriate. Can provide bus ticket, he has [facility] flyer.    Barriers to Discharge: Transport    Plan: Will cont to follow Report to [facility] given  "

## 2022-05-23 NOTE — DISCHARGE INSTRUCTIONS
You will feel a lot better if you take your medications as prescribed.  Call your mental health provider first thing in the morning and arrange office recheck as soon as possible this week.  Return here if you require emergency medical or psychiatric care.

## 2022-05-23 NOTE — ED NOTES
Verified pt able to go to apartment at anytime.  Plan is to wake pt up and provide d/c instructions and take taxi cab home.

## 2023-01-10 ENCOUNTER — NON-PROVIDER VISIT (OUTPATIENT)
Dept: OCCUPATIONAL MEDICINE | Facility: CLINIC | Age: 43
End: 2023-01-10

## 2023-01-10 DIAGNOSIS — Z02.1 PRE-EMPLOYMENT DRUG SCREENING: ICD-10-CM

## 2023-01-10 PROCEDURE — 80305 DRUG TEST PRSMV DIR OPT OBS: CPT | Performed by: NURSE PRACTITIONER

## 2023-07-10 ENCOUNTER — HOSPITAL ENCOUNTER (EMERGENCY)
Facility: MEDICAL CENTER | Age: 43
End: 2023-07-10
Attending: EMERGENCY MEDICINE
Payer: MEDICARE

## 2023-07-10 VITALS
WEIGHT: 157 LBS | HEART RATE: 50 BPM | TEMPERATURE: 98 F | DIASTOLIC BLOOD PRESSURE: 77 MMHG | SYSTOLIC BLOOD PRESSURE: 115 MMHG | RESPIRATION RATE: 17 BRPM | BODY MASS INDEX: 21.26 KG/M2 | OXYGEN SATURATION: 98 % | HEIGHT: 72 IN

## 2023-07-10 DIAGNOSIS — F41.9 ANXIETY: ICD-10-CM

## 2023-07-10 DIAGNOSIS — S93.402A SPRAIN OF LEFT ANKLE, UNSPECIFIED LIGAMENT, INITIAL ENCOUNTER: ICD-10-CM

## 2023-07-10 PROCEDURE — 99283 EMERGENCY DEPT VISIT LOW MDM: CPT

## 2023-07-10 NOTE — ED PROVIDER NOTES
"ED Provider Note    CHIEF COMPLAINT  Chief Complaint   Patient presents with    Anxiety     Pt BIB ems due to feelings of anxiousness and trouble concentrating, hx of paranoid schizophrenia, denies SI/HI       EXTERNAL RECORDS REVIEWED  Patient's last encounter was for hallucinations this was May 2022.    HPI/ROS  LIMITATION TO HISTORY   Select: : None  OUTSIDE HISTORIAN(S):  None    Dallin Lowry is a 43 y.o. male who presents to the emergency department initially, reporting anxiety in triage.  On my evaluation, patient states that he was running and he sprained his left ankle.  It feels better now.  He is able to walk without difficulty.  When asked about his anxiety that was described in triage she states that it is \"better\".  He is resting and appears comfortable.  He denies smoking, drinking or drug use.  He denies suicidal or homicidal ideations.  He when asked if there were something else I could help him with he states \"another blanket\".  He denies any further needs.    PAST MEDICAL HISTORY   has a past medical history of Auditory hallucinations, Psychiatric disorder, and Schizophrenia ([facility]) (2001).    SURGICAL HISTORY  patient denies any surgical history    FAMILY HISTORY  No family history on file.    SOCIAL HISTORY  Social History     Tobacco Use    Smoking status: Every Day     Packs/day: 0.50     Types: Cigarettes    Smokeless tobacco: Never   Vaping Use    Vaping Use: Never used   Substance and Sexual Activity    Alcohol use: No    Drug use: Not Currently     Types: Inhaled     Comment:  meth     Sexual activity: Not on file       CURRENT MEDICATIONS  Home Medications    **Home medications have not yet been reviewed for this encounter**         ALLERGIES  Allergies   Allergen Reactions    Zyprexa      \"jaw lock up\"    Geodon [ ]      \"I get lock jaw\"    Haldol [Haloperidol]      \"I get lock jaw\"    Other Drug      Pt reports having \"adverse reactions\" to \"knock off medications\" (various psych " "medications)  Pt reports reactions such as \"locked jaw\" and \"tongue rolling down throat\"    Paliperidone      \"jaw locks up\"    Risperdal  [Benzoic Acid-Risperidone]      \"jaw locks up\"    Risperidone And Related      \"I get lock jaw\"    Ziprasidone      Other reaction(s): Other  mouth locks up states patient       PHYSICAL EXAM  VITAL SIGNS: /77   Pulse (!) 50   Temp 36.7 °C (98 °F) (Temporal)   Resp 17   Ht 1.829 m (6')   Wt 71.2 kg (157 lb)   SpO2 98%   BMI 21.29 kg/m²    Vitals reviewed.  Constitutional: Patient is oriented to person, place, and time. Appears well-developed and well-nourished. No distress.  Resting, appears comfortable.  Head: Normocephalic and atraumatic.   Mouth/Throat: Oropharynx is clear and moist  Eyes: Conjunctivae are normal. Pupils are equal, round  Neck: Normal range of motion.   Cardiovascular: Normal rate, regular rhythm and normal heart sounds.  Pulmonary/Chest: Effort normal and breath sounds normal. No respiratory distress, no wheezes, rhonchi, or rales.   Abdominal: Soft. Bowel sounds are normal. There is no tenderness  Musculoskeletal: No edema and no tenderness.   Neurological: No cranial nerve deficits, passive exam. Normal motor and sensory exam. No focal deficits.   Skin: Skin is warm and dry. No erythema. No pallor.   Psychiatric: Patient has a normal mood and affect.     DIAGNOSTIC STUDIES / PROCEDURES      COURSE & MEDICAL DECISION MAKING    ED Observation Status? No; Patient does not meet criteria for ED Observation.     INITIAL ASSESSMENT, COURSE AND PLAN  Care Narrative:     This is a well-appearing 43-year-old male who presents initially with anxiety but now he is reporting that this is resolved.  He is also complained of left ankle pain from running injury a few days ago but this is also improved.  He has reassuring vital signs.  He is in no distress.  Denies SI or HI.    He denies additional needs.  He is encouraged to return if worse or new concerning " symptoms.  He is well-appearing and nontoxic.  He is discharged home in stable condition.    DISPOSITION AND DISCUSSIONS  I have discussed management of the patient with the following physicians and TASIA's: None    Discussion of management with other QHP or appropriate source(s): None    Escalation of care considered, and ultimately not performed: None    Barriers to care at this time, including but not limited to: Patient does not have established PCP.     Decision tools and prescription drugs considered including, but not limited to: Not applicable.    FINAL DIAGNOSIS  1. Sprain of left ankle, unspecified ligament, initial encounter    2. Anxiety           Electronically signed by: Rayna Law D.O., 7/10/2023 5:45 AM

## 2023-07-10 NOTE — ED TRIAGE NOTES
Chief Complaint   Patient presents with    Anxiety     Pt BIB ems due to feelings of anxiousness and trouble concentrating, hx of paranoid schizophrenia, denies SI/HI     Pt [place] around [place] of the library, wants assistance getting off the streets

## 2023-08-05 ENCOUNTER — HOSPITAL ENCOUNTER (EMERGENCY)
Facility: MEDICAL CENTER | Age: 43
End: 2023-08-09
Attending: EMERGENCY MEDICINE
Payer: MEDICARE

## 2023-08-05 DIAGNOSIS — R62.7 FAILURE TO THRIVE IN ADULT: ICD-10-CM

## 2023-08-05 DIAGNOSIS — F20.0 PARANOID SCHIZOPHRENIA (HCC): ICD-10-CM

## 2023-08-05 DIAGNOSIS — Z59.00 HOMELESSNESS: ICD-10-CM

## 2023-08-05 DIAGNOSIS — F29 PSYCHOSIS, UNSPECIFIED PSYCHOSIS TYPE (HCC): Primary | ICD-10-CM

## 2023-08-05 PROCEDURE — 99285 EMERGENCY DEPT VISIT HI MDM: CPT

## 2023-08-05 SDOH — ECONOMIC STABILITY - HOUSING INSECURITY: HOMELESSNESS UNSPECIFIED: Z59.00

## 2023-08-06 PROCEDURE — 700102 HCHG RX REV CODE 250 W/ 637 OVERRIDE(OP): Mod: UD | Performed by: PSYCHIATRY & NEUROLOGY

## 2023-08-06 PROCEDURE — A9270 NON-COVERED ITEM OR SERVICE: HCPCS | Mod: UD | Performed by: PSYCHIATRY & NEUROLOGY

## 2023-08-06 PROCEDURE — 90791 PSYCH DIAGNOSTIC EVALUATION: CPT

## 2023-08-06 RX ORDER — MIRTAZAPINE 15 MG/1
7.5 TABLET, FILM COATED ORAL
Status: DISCONTINUED | OUTPATIENT
Start: 2023-08-06 | End: 2023-08-09 | Stop reason: HOSPADM

## 2023-08-06 RX ORDER — ARIPIPRAZOLE 10 MG/1
5 TABLET ORAL DAILY
Status: DISCONTINUED | OUTPATIENT
Start: 2023-08-06 | End: 2023-08-09 | Stop reason: HOSPADM

## 2023-08-06 NOTE — ED NOTES
Bedside report from Mariana ARIAS.  Pt resting on gurney, NAD noted, respirations even and unlabored.

## 2023-08-06 NOTE — DISCHARGE PLANNING
Alert Team:    Spoke to [facility] admissions and pt was declined. Pt is out of lifetime Medicare Lifetime psyc days and [facility] is out of contract.     Brad Sorensen RN, KRYS

## 2023-08-06 NOTE — ED PROVIDER NOTES
"ED Provider Note    Scribed for Dane Neri by Douglas Gaspar. 8/5/2023  9:37 PM    Primary care provider: Patient reports no primary care provider.   Means of arrival: EMS  History obtained from: Patient  History limited by: None    CHIEF COMPLAINT  Chief Complaint   Patient presents with    Psych Eval     EXTERNAL RECORDS REVIEWED  External ED Note: Patient was seen by [facility] Emergency Department on the 10th of last month for mental health assessment, seeking aid for his psychiatric issues. Patient has a history of paranoid schizophrenia.    HPI/ROS  LIMITATION TO HISTORY   None    HPI  Dallin Lowry is a 43 y.o. male who presents to the Emergency Department via EMS for a psychiatric evaluation. EMS reported that he was found on the streets and has a history of schizophrenic episodes; they report that he has not been taking his medications. He reports that yesterday he hurt his right knee, though he reports that he wants to be seen for mental health, since he is having \"something mental going on, not physical.\" He denies any suicidal ideation and states that he is worried he will be hit by a car during [place] due to his psychiatric issues and disorganized thoughts. He reports that he used to be on medications but didn't like taking them. He denies any current drug use.     REVIEW OF SYSTEMS  As above, all other systems reviewed and are negative.   See HPI for further details.     PAST MEDICAL HISTORY   has a past medical history of Auditory hallucinations, Psychiatric disorder, and Schizophrenia ([facility]) (2001).    SURGICAL HISTORY  patient denies any surgical history    SOCIAL HISTORY  Social History     Tobacco Use    Smoking status: Every Day     Packs/day: 0.50     Types: Cigarettes    Smokeless tobacco: Never   Vaping Use    Vaping Use: Never used   Substance Use Topics    Alcohol use: No    Drug use: Not Currently     Types: Inhaled     Comment:  meth       Social History " "    Substance and Sexual Activity   Drug Use Not Currently    Types: Inhaled    Comment:  meth      FAMILY HISTORY  History reviewed. No pertinent family history.    CURRENT MEDICATIONS  No current outpatient medications   ALLERGIES  Allergies   Allergen Reactions    Zyprexa      \"jaw lock up\"    Geodon [ ]      \"I get lock jaw\"    Haldol [Haloperidol]      \"I get lock jaw\"    Other Drug      Pt reports having \"adverse reactions\" to \"knock off medications\" (various psych medications)  Pt reports reactions such as \"locked jaw\" and \"tongue rolling down throat\"    Paliperidone      \"jaw locks up\"    Risperdal  [Benzoic Acid-Risperidone]      \"jaw locks up\"    Risperidone And Related      \"I get lock jaw\"    Ziprasidone      Other reaction(s): Other  mouth locks up states patient     PHYSICAL EXAM    VITAL SIGNS:   Vitals:    08/05/23 2101 08/05/23 2109 08/06/23 0100   BP:  (!) 148/95 (!) 151/84   Pulse:  77 72   Resp:  20 16   Temp:  36.4 °C (97.5 °F)    TempSrc:  Temporal    SpO2:  95% 92%   Weight: 68 kg (150 lb)     Height: 1.829 m (6')         Vitals: My interpretation: hypertensive, not tachycardic, afebrile, not hypoxic    Reinterpretation of vitals: Unchanged unremarkable    PE:   Gen: sitting comfortably, speaking clearly, appears in no acute distress   ENT: Mucous membranes moist, posterior pharynx clear, uvula midline, nares patent bilaterally   Neck: Supple, FROM  Pulmonary: Lungs are clear to auscultation bilaterally. No tachypnea  CV:  RRR, no murmur appreciated, pulses 2+ in both upper and lower extremities  Abdomen: soft, NT/ND; no rebound/guarding  : no CVA or suprapubic tenderness   Neuro: A&Ox4 (person, place, time, situation), speech fluent, gait steady, no focal deficits appreciated  Skin: No rash or lesions.  No pallor or jaundice.  No cyanosis.  Warm and dry.   Psych: Patient has pressured speech, paranoia, denies suicidal or homicidal ideation, is alert and oriented, pressured affect    COURSE " & MEDICAL DECISION MAKING  Nursing notes, VS, PMSFHx, labs, imaging, EKG reviewed in chart.    ED Observation Status? Yes; I am placing the patient in to an observation status due to a diagnostic uncertainty as well as therapeutic intensity. Patient placed in observation status at 12:04 AM, 8/6/2023.     Observation plan is as follows: Diagnosis uncertainty, need for therapy intensity, evaluation by psychiatric professionals    Upon Reevaluation, the patient's condition has: Patient will be signed out to oncoming provider who will continue to monitor on ED observation status until patient can find a psychiatric bed in the community.    Ddx: Schizophrenia, paranoia, psychosis    MDM: 9:37 PM Dallin Lowry is a 43 y.o. male who presented with, severe psychosis.  Patient is well-known to the department with frequent utilization, has known paranoid psychosis, homelessness, and poor social supports.  He arrives tonight asking to speak to medical health professional.  He has no medical complaints.  He has mild hypertension otherwise unremarkable vital signs.  He has no signs of trauma on physical exam abdomen is benign.  Psychiatric exam shows pressured speech,, paranoid thought process, but denial of suicidal or homicidal ideation, does appear to have no auditory or visual hallucinations.  Patient medically cleared to be seen by psychiatry.  He is refusing medications at this time.  He is not compliant with his psychiatric medications at baseline.  He is asking for transfer to a psychiatric facility.  He does seem to have decompensated paranoid psychosis at this time I discussed with the alert team nurse for behavioral evaluation.  Behavioral health specialist to see the patient agrees that patient is in decompensated psychosis, unable to self-care, off medications and would benefit from psychiatric placement stabilization.  Patient placed on legal hold, paperwork signed, will continue monitoring signout to oncoming  physician until patient be transferred for psychiatric bed.  Patient resting comfortably in no acute distress, still refuses any medications at this time here.    ADDITIONAL PROBLEM LIST AND DISPOSITION    I have discussed management of the patient with the following physicians and TASIA's: Discussed with behavioral health recommends legal hold and transfer    Discussion of management with other QHP or appropriate source(s): Behavioral Health specialist will evaluate patient      Escalation of care considered, and ultimately not performed:acute inpatient care management, however at this time, the patient is most appropriate for outpatient management    Barriers to care at this time, including but not limited to: Patient does not have established PCP, Patient is homeless, Patient lacks transportation , Patient does not have insurance, and Patient lacks financial resources.     Decision tools and prescription drugs considered including, but not limited to:  None .    FINAL IMPRESSION  1. Psychosis, unspecified psychosis type (HCC) Acute   2. Paranoid schizophrenia (HCC) Acute   3. Homelessness Acute   4. Failure to thrive in adult Acute        Douglas MORENO (Gorge), am scribing for, and in the presence of, Dane Neri.     Electronically signed by: Douglas Gaspar (Gorge), 8/5/2023     IDane personally performed the services described in this documentation, as scribed by Douglas Gaspar in my presence, and it is both accurate and complete.     The note accurately reflects work and decisions made by me.  Dane Neri  8/5/2023  10:24 PM

## 2023-08-06 NOTE — CONSULTS
"  Name: Dallin Lowry  MRN: [id number]  : 1980  Age: 43 y.o.  Date of assessment: 2023  PCP: Pcp Pt States None  Persons in attendance: Patient  Patient Location: [facility]    CHIEF COMPLAINT/PRESENTING ISSUE (as stated by pt):   Chief Complaint   Patient presents with    Psych Eval      Pt is 43 year old male presenting to the ED for decompensation. PT does report that he does need assistance with his mental health, however, perseverates on showering. PT presents as A + O x 1, he is not aware of the city he is in, the time, date, or month. Pt presents as disorganized, somewhat guarded, incoherent at times, delusional, responding to internal stimuli, pleasant.  PT denies SI/HI but does report intermittent AH/VH's \"depending on the circumstances.\" PT reports that he and his father are twins and have the same name. PT also makes constant references to computers and wifi and appears fearful of technology. PT reports history of inpatient and outpatient, however, refuses to elaborate. History taken from previous assessment (see below). PT denies any prescription medications but reports that he would like to get back on medications. Denies current psychiatrist. Denies drug or alcohol use. Consulted with ERP and pt to be put on legal hold for inability to care for self.    CURRENT LIVING SITUATION/SOCIAL SUPPORT/FINANCIAL RESOURCES: PT is homeless, however, refuses to elaborate on income/SS.     BEHAVIORAL HEALTH/SUBSTANCE USE TREATMENT HISTORY  Does patient/parent report a history of prior behavioral health/substance use treatment for patient?   Yes:      PT reports history of inpatient and outpatient, however, refuses to elaborate. History taken from previous assessment.    Dates Level of Care Facilty/Provider Diagnosis/Problem Medications   2019 Outpt [facility] Schizophrenia, MDD Unknown    Inpt [facility] [place]       [id number], approximately [facility] Unknown           SAFETY ASSESSMENT - SELF  Does patient acknowledge current or past symptoms of dangerousness to self or is previous history noted? no  Does parent/significant other report patient has current or past symptoms of dangerousness to self? no  Does presenting problem suggest symptoms of dangerousness to self? No    SAFETY ASSESSMENT - OTHERS  Does patient acknowledge current or past symptoms of aggressive behavior or risk to others or is previous history noted? no  Does parent/significant other report patient has current or past symptoms of aggressive behavior or risk to others?  no  Does presenting problem suggest symptoms of dangerousness to others? No    LEGAL HISTORY  Does patient acknowledge history of arrest/custodial/long term or is previous history noted? Unknown. Refuses to talk about it    Crisis Safety Plan completed and copy given to patient? N\A    ABUSE/NEGLECT SCREENING  Does patient report feeling “unsafe” in his/her home, or afraid of anyone?  no  Does patient report any history of physical, sexual, or emotional abuse?  no  Does parent or significant other report any of the above? no  Is there evidence of neglect by self?  no  Is there evidence of neglect by a caregiver? no  Does the patient/parent report any history of CPS/APS/police involvement related to suspected abuse/neglect or domestic violence? no  Based on the information provided during the current assessment, is a mandated report of suspected abuse/neglect being made?  No    SUBSTANCE USE SCREENING  Yes:  Srinivasan all substances used in the past 30 days:    Denies use      Last Use Amount   []   Alcohol     []   Marijuana     []   Heroin     []   Prescription Opioids  (used without prescription, for    recreation, or in excess of prescribed amount)     []   Other Prescription  (used without prescription, for    recreation, or in excess of prescribed amount)     []   Cocaine      []    "Methamphetamine     []   \"\" drugs (ectasy, MDMA)     []   Other substances        UDS results: Negative  Breathalyzer results: 0.00    What consequences does the patient associate with any of the above substance use and or addictive behaviors? None    Risk factors for detox (check all that apply):  []  Seizures   []  Diaphoretic (sweating)   []  Tremors   []  Hallucinations   []  Increased blood pressure   []  Decreased blood pressure   []  Other   []  None      [] Patient education on risk factors for detoxification and instructed to return to ER as needed.      MENTAL STATUS   Participation: Active verbal participation and Guarded  Grooming: Disheveled  Orientation: Alert, Evidence of delusions present, and Evidence of hallucinations present  Behavior: Calm  Eye contact: Poor  Mood: Anxious  Affect: Anxious  Thought process: Logical and Perseveration  Thought content: Within normal limits, Preoccupation, Evidence of delusion, and Paranoia  Speech: Rate within normal limits and Volume within normal limits  Perception: Within normal limits, Evidence of auditory hallucination, and Evidence of hallucination  Memory:  Recent:  Limited  Insight: Poor  Judgment:  Poor  Other:    Collateral information:   Source:  [] Significant other present in person:   [] Significant other by telephone  [x] [facility]   [x] [facility] Nursing Staff  [] [facility] Medical Record  [] Other:     [] Unable to complete full assessment due to:  [] Acute intoxication  [] Patient declined to participate/engage  [] Patient verbally unresponsive  [] Significant cognitive deficits  [] Significant perceptual distortions or behavioral disorganization  [] Other:      CLINICAL IMPRESSIONS:  Primary:  Psychosis   Secondary:         IDENTIFIED NEEDS/PLAN:  [Trigger DISPOSITION list for any items marked]    []  Imminent safety risk - self [] Imminent safety risk - others   []  Acute substance withdrawal [x]  Psychosis/Impaired reality testing "   [x]  Mood/anxiety []  Substance use/Addictive behavior   []  Maladaptive behaviro []  Parent/child conflict   []  Family/Couples conflict []  Biomedical   [x]  Housing [x]  Financial   []   Legal  Occupational/Educational   []  Domestic violence []  Other:     Recommended Plan of Care:  Actively being addressed by Legal [facility], Refer to [facility], [facility], and [facility], and no belongings, no visitors. Q15 rounding as pt denies SI/HI. Inability to care for self. Medicare and Medicaid FFS Insurance.      Has the Recommended Plan of Care/Level of Observation been reviewed with the patient's assigned nurse? yes    Does patient/parent or guardian express agreement with the above plan? yes    Referral appointment(s) scheduled? yes    Alert team only:   I have discussed findings and recommendations with Dr. Neri who is in agreement with these recommendations.     Referral information sent to the following community providers : [facility], [facility], Kristy Sorensen R.N., MBA

## 2023-08-06 NOTE — ED NOTES
1:1 giovana initiated for patient safety and elopement risk.   Giovana Almeida given report on pt.

## 2023-08-06 NOTE — DISCHARGE PLANNING
[facility] ALERT TEAM DISCHARGE PLANNING NOTE    Date:  8/6/23  Patient Name:  Dallin Best y.o. - Discharge Planning  MRN:  [id number]   YOB: 1980  ADMISSION DATE:  8/5/2023     Writer forwarded referral packet for inpatient psychiatric care to the following community providers:  [facility] via [employer]    Items included in the referral packet:   __x___Face Sheet   __x___Pages 1 and 2 of completed legal hold   __x___Alert Team/Psych Assessment   __x___H&P   _____UDS   __x___Blood Alcohol   __x___Vital signs   __n/a___Pregnancy Test (if applicable)   _____Medications List   _____Covid Screen

## 2023-08-06 NOTE — PROGRESS NOTES
ED Observation Progress Note    Date of Service: 08/06/23    Interval History and Interventions  Briefly this is a 43-year-old male who presented yesterday on 8/5/2023 for psychiatric evaluation, he has history of schizophrenia.  He is having hallucinations, is on a legal hold for psychosis and is pending psychiatric placement.  Tried to figure out patient's medications, he is not cooperative with telling me what medications he has been on.  Per chart review has been on Seroquel in the past but unclear when he has last been on this.  Pharmacy team was notified they were unable to complete med rec.  We will wait on medication recommendations per psychiatry.      If patient becomes agitated or uncooperative or is manic can start Seroquel, in May 2022 he was on 200 mg nightly.  This morning he is resting comfortably and therefore he is not given a dose.    Physical Exam  BP (!) 151/84   Pulse 72   Temp 36.4 °C (97.5 °F) (Temporal)   Resp 16   Ht 1.829 m (6')   Wt 68 kg (150 lb)   SpO2 92%   BMI 20.34 kg/m² .    Constitutional: Awake and alert. Nontoxic  HENT:  Grossly normal  Eyes: Grossly normal  Neck: Normal range of motion  Cardiovascular: Normal heart rate   Thorax & Lungs: No respiratory distress\  Skin:  No pathologic rash.   Extremities: Well perfused  Psychiatric: Affect normal    Labs  Results for orders placed or performed in visit on 01/10/23   POCT 11 Panel Urine Drug Screen   Result Value Ref Range    AMPHETAMINE      COCAINE      POC THC      METHAMPHETAMINES      OPIATES      PHENCYCLIDINE      BENZODIAZIPINES      BARBITURATES      METHADONE      MDMA Ecstasy      OXYCODONE      Urine Drug Screen NEG     Internal Control Positive Valid     Internal Control Negative Valid        Radiology  No orders to display       Problem List  1.  Psychosis/michael on legal hold pending placement-    Electronically signed by: Elba Stevens M.D., 8/6/2023 5:48 AM

## 2023-08-06 NOTE — CONSULTS
"[facility] PSYCHIATRIC CONSULT:Intake  Reason for admission: EMS reported that he was found on the streets and has a history of schizophrenic episodes; they report that he has not been taking his medications... he reports that he wants to be seen for mental health, since he is having \"something mental going on, not physical.\"   Consulting Physician/APN/PA: Dane Neri MD  Reason for Consult:inability to care for self  Consultant: Sarahi Sotelo MD    Legal Status  on hold     CC:Mr Lowry? \"So they say\"  HPI: 44 yo male who is largely uncooperative though he is pleasant about it. Says that all he wants is lunch and a shower because m going to give him resources and discharge him. This is before I say anything else after introducing myself. I explained that while that was a possibly he might be transferred to in as well. \"No you are just going to give me resources\". Changed direction of questions and said, I read you might have a hx of schizophrenia? \"It might be hearsay\". About AH, \"that might be something I keep to myself\". Asked what I can do for him. \"Give me a shower\".     Chart(s) Review:  ED 7/10/2023 for anxiety and wanting another blanket. He came in on his own. Denies PSA. Allergies are to gedon, zyprexa, risperdol. Reportedly seen at [facility] that day as well.    Outside records:  2012: paranoid, responding to int stim. . Threatened to jump off a bridge Did not want meds. Liked remeron given for sleep. Got kicked out of a board and care home. Was allergic to haldol and effexor. THC.  Given seroquel. Said his main issues was depression.  Though later admited to . Dx with schizophrenia and major depression.  2013: anxiety, schizophrenia, SI  2015: schizophrenia, major depression  2016: same........................  .  .  .  2016 BIB ambulance after calling 911 to report hallucinations. \" If there is an issue with my name being Dallin don't you think that should be taken up with my " "mother? I don't want to answer anymore questions. I can just go now.\"Schizophrenia and Amphetamine-type substance use disorder, Moderate  2016 disorganized schiz  .  .  .  Has many yearly ED visits in [place] for similar presentations and dx. Up until 2023. Now coming to our ED    Medical ROS:  Review of systems  unable to obtain    Psychiatric Exam (MSE):  Vitals:Blood pressure 125/88, pulse 64, temperature 36.4 °C (97.5 °F), temperature source Temporal, resp. rate 16, height 1.829 m (6'), weight 68 kg (150 lb), SpO2 99 %.    Constitutional: thin, appears tall, seems to have large ears and teeth that have spaces in between ie looks odd. Not cooperative though he asked to see mental health.   General Appearance:as noted  Musculoskeletal: no issues noted  Alert/Orientation: grossly intact  Attn/Concentration: intact  Fund of Knowledge:not tested  Memory recent/remote: grossly intact  Speech: wnl  Language: fluent  Thought Content: + psychosis, unknown SI/HI     Thought Process: vague, as non informative as he can   Insight/Judgement:impaired  Mood: unidentified  Affect: guarded    Past Medical Hx:     Past Medical History:   Diagnosis Date    Auditory hallucinations     Psychiatric disorder     Schizophrenia (HCC) 2001         Past Psychiatric Hx: unable to obtain       Family Psych Hx:  History reviewed. No pertinent family history.    Social Hx:   Housing:suspect he is homeless  Financial:?  Support:?     Abuse:?  Drugs/Alcohol: per records, hx of meth, alc but degree unknown    Labs:  Lab Results   Component Value Date/Time    AMPHUR Negative 05/18/2022 1801    BARBSURINE Negative 05/18/2022 1801    BENZODIAZU Negative 05/18/2022 1801    COCAINEMET Negative 05/18/2022 1801    METHADONE Negative 05/18/2022 1801    ECSTASY Negative 12/04/2016 1740    OPIATES Negative 05/18/2022 1801    OXYCODN Negative 05/18/2022 1801    PCPURINE Negative 05/18/2022 1801    PROPOXY Negative 05/18/2022 1801    CANNABINOID Negative " 05/18/2022 1801     No results for input(s): WBC, RBC, HEMOGLOBIN, HEMATOCRIT, MCV, MCH, RDW, PLATELETCT, MPV, NEUTSPOLYS, LYMPHOCYTES, MONOCYTES, EOSINOPHILS, BASOPHILS, RBCMORPHOLO in the last 72 hours.  No results for input(s): SODIUM, POTASSIUM, CHLORIDE, CO2, GLUCOSE, BUN, CPKTOTAL in the last 72 hours.    Cranial Imaging: personally reviewed  Cranial CT  Cranial MRI:       EKG: QTc:      EEG:  N/A    reported as:     Meds Current:  No current facility-administered medications for this encounter.     Allergies: Zyprexa, Geodon [ ], Haldol [haloperidol], Other drug, Paliperidone, Risperdal  [benzoic acid-risperidone], Risperidone and related, and Ziprasidone      Assessement    1. Schizophrenia  paranoid type    Medical:    -none acutely      Recommendations:  Legal Status:  extended;  Observation status: ;  -line of site with sitter;  Visitors:  no;  Personal belongings: yes    no    Discussed/voalted: WILD Stevens MD    Medication and Other Recommendations: final orders as per Tx Tm   Abilify 5 mg  Remeron 7.5 mg hs  From review of notes, everything psychiatric causes and allergy but he has liked remeron so report may be inaccrate.       Will continue to follow with you.    Thank you for the consult.        Discharge recommendations: inpt psych     If released from [facility]: Discharge Instructions:  -Reviewed safety plan: 911, ER, PCM, MHC, Suicide crisis line  -Please assist with outpatient Psychiatric/substance use follow up appointments at discharge once medically cleared.

## 2023-08-06 NOTE — ED NOTES
Pt is resting on gurney. No signs/symptoms of distress noted, respirations even, equal and non-labored. Remains under 1:1 observation of designee monitoring in hallway.

## 2023-08-06 NOTE — ED NOTES
Patient's home medications have been reviewed by the pharmacy team.     Past Medical History:   Diagnosis Date    Auditory hallucinations     Psychiatric disorder     Schizophrenia (HCC) 2001       Patient's Medications    No medications on file          A:  Medications do not appear to be contributing to current complaints.     P:    No recommendations at this time. Medication therapy ordered per psychiatry.      Osvaldo JonesD, BCPS

## 2023-08-06 NOTE — ED NOTES
Pt resting on gurney, NAD noted, respirations even and unlabored. Q15 minutes observation in progress.

## 2023-08-06 NOTE — ED NOTES
Pt placed on legal hold, pt in hospital clothing. Belongings removed from room.  Pt provided microwave meal.   Q15 minute observation initiated.

## 2023-08-06 NOTE — ED NOTES
PT refusing psychiatric medication, MD Mcdermott made aware. PT remaining calm and cooperative otherwise.

## 2023-08-06 NOTE — DISCHARGE PLANNING
[facility] ED Behavioral Health Fax Referral      [facility] ED Behavioral Health Alert Team:  [phone]    Referral: Legal Hold    Intervention: Patient referral to [facility] inpatient  facillity    Legal Hold Initiated: Date: 08/06/23 Time: 01000    Patient’s Insurance Listed on Face Sheet: Medicare, Medicaid FFS    Referrals sent to: José Antonio HENLEY, [facility]    Referrals faxed by Brad Sorensen RN, KRYS    This referral contains the following information:  Face sheet __x__  Page 1 and Page 2 of Legal Hold __x__  Alert Team Assessment/Psych Assessment __x__  Head to toe physical exam __x__  Urine Drug Screen _x___  Blood Alcohol _x___  Vital signs __x__  Pregnancy test when applicable ___  Medications list ____  Covid screening ____    Plan: Patient will transfer to mental health facility once acceptance is obtained

## 2023-08-06 NOTE — ED NOTES
Pt sleeping on gurney, NAD noted, respirations even and unlabored. Q15 minute observation continued.

## 2023-08-06 NOTE — DISCHARGE PLANNING
Alert Team Note:    Contacted [place], spoke to Jesus. Pt referral could not be located. Writer has faxed new referral as requested.

## 2023-08-07 LAB
FLUAV RNA SPEC QL NAA+PROBE: NEGATIVE
FLUBV RNA SPEC QL NAA+PROBE: NEGATIVE
RSV RNA SPEC QL NAA+PROBE: NEGATIVE
SARS-COV-2 RNA RESP QL NAA+PROBE: NOTDETECTED
SPECIMEN SOURCE: NORMAL

## 2023-08-07 PROCEDURE — C9803 HOPD COVID-19 SPEC COLLECT: HCPCS

## 2023-08-07 PROCEDURE — 0241U HCHG SARS-COV-2 COVID-19 NFCT DS RESP RNA 4 TRGT MIC: CPT

## 2023-08-07 PROCEDURE — C9803 HOPD COVID-19 SPEC COLLECT: HCPCS | Performed by: STUDENT IN AN ORGANIZED HEALTH CARE EDUCATION/TRAINING PROGRAM

## 2023-08-07 NOTE — CONSULTS
"  [facility] PSYCHIATRIC FOLLOW-UP:(established)  *Reason for admission:   EMS reported that he was found on the streets and has a history of schizophrenic episodes; they report that he has not been taking his medications... he reports that he wants to be seen for mental health, since he is having \"something mental going on, not physical.\"           Legal Status  on hold     S:  gives very little to no information. Says he wants to be transferred or dc'd though yesterday he was complaining that we would dc him and he needs help which he won't specify because he is too guarded to do so.     O: Medical ROS (as pertinent):                      *Psychiatric Examination:   Vitals:   Vitals:    08/06/23 0100 08/06/23 0600 08/06/23 1027 08/06/23 1800   BP: (!) 151/84 117/79 125/88 122/86   Pulse: 72 (!) 54 64 66   Resp: 16 14 16 16   Temp:       TempSrc:       SpO2: 92% 99% 99% 98%   Weight:       Height:         Constitutional: good eye contact. Not cooperative though he asked to see mental health.   General Appearance:as noted  Musculoskeletal: no issues noted  Alert/Orientation: grossly intact  Attn/Concentration: intact  Fund of Knowledge:not tested  Memory recent/remote: grossly intact  Speech: wnl  Language: fluent  Thought Content: + psychosis, unknown SI/HI     Thought Process: vague, as non informative as he can   Insight/Judgement:impaired  Mood: unidentified  Affect: guarded     Current Medications:  Scheduled Medications   Medication Dose Frequency    ARIPiprazole  5 mg DAILY    mirtazapine  7.5 mg QHS          *ASSESSMENT/RECOMENDATIONS:  1.Schizophrenia  paranoid type     Medical:    -none acutely        Recommendations:  Legal Status:  extended;  Observation status: ;  -line of site with sitter;  Visitors:  no;  Personal belongings: yes    no     Discussed/voalted: WILD Stevens MD     Medication and Other Recommendations: final orders as per Tx Tm   Abilify 5 mg: refused  Remeron 7.5 mg hs " refused though he wanted help with sleep  From review of notes, everything psychiatric causes anallergy but he has liked remeron so report may be inaccurate.        Will continue to follow with you.             Discharge recommendations: inpt psych      If released from [facility]: Discharge Instructions:  -Reviewed safety plan: 911, ER, PCM, MHC, Suicide crisis line  -Please assist with outpatient Psychiatric/substance use follow up appointments at discharge once medically cleared.

## 2023-08-07 NOTE — ED NOTES
Patient resting calmly on gurney.  Active chest rise noted.  1:1 sitter in direct view of patient.

## 2023-08-07 NOTE — ED NOTES
Patient laying on stretcher in NAD. Sitter in doorway for direct observation. Will continue to monitor

## 2023-08-07 NOTE — ED NOTES
Checked on bed, with unlabored respirations. No safety risk noted  Awake   Sitter - 1:1 with continuous visual monitoring by Trained Personnel  Continued safety precaution  Gurney in low position, side rail up for pt safety.   Will continue to monitor for any complications.

## 2023-08-07 NOTE — ED NOTES
Patient refused Remeron. Says will only take psych medication once transferred to a psych facility. ERP notified.

## 2023-08-07 NOTE — ED NOTES
Report from HUGO Chou    Patient resting on stretcher in no acute distress. Equal chest rise and fall noted. Sitter in doorway for direct 1:1 observation. Room safety checklist in use. No needs at this time, will continue to monitor

## 2023-08-07 NOTE — ED NOTES
Checked on bed, with unlabored respirations. No safety risk noted.  Bedside report received from HUGO Lawton  Awake sitting on bed  Sitter - 1:1 with continuous visual monitoring by Trained Personnel  Continued safety precaution  Robertrvictor manuel in low position, side rail up for pt safety.   Will continue to monitor for any complications.     Ninoska Velasquez Bhaskar # REPORTS EPISODE OF LIGHTHEADEDNESS - REVIEWED ORTHOSTATIC VITALS, S/P IVF, EP AND CARDIOLOGY CONSULT - SUGGEST THIS IS S/T DECONDITIONING AND RECOMMEND OUTPATIENT PT [REVIEWED PT EVALUATION] AND F/U WITH CARDIOLOGY FOR FURTHER EVALUATION  # SINUS TACHYCARDIA - INTERMITTENTLY WITH STANDING - ON TELEMETRY, S/P IVF, UCX - NGTD AND BCX - NGTD  # PULMONARY EMBOLISM RECENTLY  PLACED ON LOVENOX NOW W/ SUSPECTED PLEURITIC CHEST PAIN - REVIEWED REPEAT CTA CHEST, EKG REVIEWED  - NOTED PATIENT IS STILL BREAST FEEDING; LAST PREGNANCY WAS 2 YEARS AGO  - COVID AB NEGATIVE RECENTLY  - MAGUI - TRACE MR  - DENIES FAMILY HX OF AUTOIMMUNE DISEASE, DENIES HX OF MISCARRIAGES, DENIES HX OF COVID, DENIES USE OF OCP ; HAS BEEN NWB AND SEDENTARY SINCE RECENT ANKLE SPRAIN  # UTI; HOWEVER UTI R/O AND PATIENT DEFERRED ROCEPHIN PENDING FINAL UCX [WHICH WAS NGTD]  # RECENT INJURY OF RIGHT ANKLE :  - MR ANKLE DEMONSTRATES - BONE CONTUSION, SUBCHONDRAL TRABECULAR FRACTURE, SMALL ANKLE JOINT EFFUSION, TEAR OF THE ANTERIOR TALOFIBULAR LIGAMENT, COMPLETE TEAR OF THE CALCANEOFIBULAR LIGAMENT, SPRAIN, HIGH GRADE TEAR OF THE DEEP TIBIOTALAR LIGAMENT AND PARTIAL THICKNESS TEAR AT THE TALOCALCANEAL LIGAMENT, HIGH-GRADE PARTIAL THICKNESS TEAR AT THE DEEP DELTOID LIGAMENT AND MODERATE PARTIAL THICKNESS TEAR AT THE SUPERFICIAL DELTOID LIGAMENT  - PODIATRY IS FOLLOWING AS OUTPATIENT - PLAN FOR SURGERY ONCE PATIENT CAN BE OFF OF LOVENOX  # VITAMIN D DEFICIENCY - ON SUPPLEMENT  # HX OF ANEMIA - RECOMMEND OUTPATIENT MONITORING & WORKUP WITH PCP  # HX OF HSV W/  GINGIVOSTOMATITIS,  GEOGRAPHIC TONGUE  - MAGIC MOUTHWASH, RECOMMEND OUTPATIENT F/U WITH DENTIST  # CASE DISCUSSED AT LENGTH WITH PATIENT AND MOTHER [OVER PHONE AT BEDSIDE] - OUTPATIENT F/U RECOMMENDED.       CARLOS VELASQUEZ MD COVERING JAY JAY Velasquez, Jay Jay Garcia

## 2023-08-07 NOTE — ED NOTES
Pt sleeping comfortably in bed, breathing is easy and unlabored. Bed in lowest position, blankets provided for comfort. No s/s of distress noted. No new needs identified,   Sitter at bedside. Will continue to monitor.

## 2023-08-07 NOTE — ED NOTES
Bedside report received from Nel ARIAS. Assumed care of patient.Fall precautions in place.   Patient resting calmly on gurney.  Active chest rise noted.  1:1 sitter in direct view of patient.

## 2023-08-07 NOTE — PROGRESS NOTES
Patient's home medications have been reviewed by the pharmacy team.     Past Medical History:   Diagnosis Date    Auditory hallucinations     Psychiatric disorder     Schizophrenia (HCC) 2001       Patient's Medications    No medications on file          A:  Medications do not appear to be contributing to current complaints as patient not on any medications at home.     P:    Abilify and mirtazapine initiated per psych recommendations.     Saleem Benedict, PharmD

## 2023-08-07 NOTE — ED NOTES
Lunch tray brought in for patient, he picked up food containers, looked through them, then stood up with lunch tray and placed entire tray in trash can. He then climbed back into bed

## 2023-08-07 NOTE — ED NOTES
Pt requested for tre crackers and juice.  Offered and able to tolerate well with aspiration precaution observed.

## 2023-08-07 NOTE — DISCHARGE PLANNING
Alert Team Note:    Contacted by [place], spoke to Teri. Pt referral has been received and is being reviewed.

## 2023-08-07 NOTE — ED NOTES
Patient ambulatory to bathroom with ED tech Brett. Back to room. Juice provided. Patient denies further needs. 1 to 1 sitter in view of patient.

## 2023-08-07 NOTE — ED NOTES
Pt sleeping comfortably in bed, breathing is easy and unlabored. Bed in lowest position, blankets provided for comfort. No s/s of distress noted. No new needs identified,   Sitter at bedside in direct view of patient. Will continue to monitor.

## 2023-08-07 NOTE — ED NOTES
Checked on bed, with unlabored respirations. No safety risk noted  Awake resting on bed.    Sitter - 1:1 with continuous visual monitoring by Trained Personnel  Continued safety precaution  Robertrvictor manuel in low position, side rail up for pt safety.   Will continue to monitor for any complications.

## 2023-08-07 NOTE — ED NOTES
Bedside report given to Gisel ARIAS. L2K. 1:1 sitter in direct view of patient. Awaiting placement.

## 2023-08-07 NOTE — ED NOTES
Checked on bed, with unlabored respirations. No safety risk noted  Sleeping repositioning noted.  Sitter - 1:1 with continuous visual monitoring by Trained Personnel  Continued safety precaution  Dave in low position, side rail up for pt safety.   Will continue to monitor for any complications.

## 2023-08-07 NOTE — ED NOTES
Attempted to medicate patient with am dose of Abilify. Patient refusing VS. Breakfast tray provided. 1 to 1 sitter in view of patient.

## 2023-08-07 NOTE — ED NOTES
Patient resting on stretcher with eyes closed. NAD noted. Sitter in doorway for direct observation

## 2023-08-07 NOTE — ED NOTES
Bedside report received from HUGO Peoples. Assumed care of patient. Patient resting calmly on gurney. 1 to 1 sitter in place, report given to 1 to 1 sitter. Room safety checklist in use.

## 2023-08-07 NOTE — ED NOTES
Pt wake up for medication and vital signs to checked but Pt strongly refused despite re-education.

## 2023-08-07 NOTE — DISCHARGE PLANNING
Alert Team Note:    Contacted by [facility], spoke to Teri. Faxed updated MAR, progress and psych notes as requested. Additionally, Teri informed writer that the second page of the pts hold was not signed or dated. Writer confirmed with  NICOLASA Dan the correct time and date and requested Alert Team HUGO Rascon to please update the hold.

## 2023-08-07 NOTE — PROGRESS NOTES
ED Observation Progress Note    Date of Service: 08/07/23    Interval History and Interventions  Briefly this is a 43-year-old male who presented on 8/5/2023 for psychiatric evaluation, underlying history of schizophrenia.  Psychiatry saw patient yesterday and he was started on Abilify 5 mg and Remeron 7.5 mg.    Patient declining taking medications this morning.    Physical Exam  /86   Pulse 66   Temp 36.4 °C (97.5 °F) (Temporal)   Resp 16   Ht 1.829 m (6')   Wt 68 kg (150 lb)   SpO2 98%   BMI 20.34 kg/m² .    Constitutional: Awake and alert. Nontoxic  HENT:  Grossly normal  Eyes: Grossly normal  Neck: Normal range of motion  Cardiovascular: Normal heart rate   Thorax & Lungs: No respiratory distress  Skin:  No pathologic rash.   Extremities: Well perfused  Psychiatric: Affect normal    Labs  Results for orders placed or performed in visit on 01/10/23   POCT 11 Panel Urine Drug Screen   Result Value Ref Range    AMPHETAMINE      COCAINE      POC THC      METHAMPHETAMINES      OPIATES      PHENCYCLIDINE      BENZODIAZIPINES      BARBITURATES      METHADONE      MDMA Ecstasy      OXYCODONE      Urine Drug Screen NEG     Internal Control Positive Valid     Internal Control Negative Valid        Radiology  No orders to display       Problem List  1.  Schizophrenia-on legal hold, psychiatry following    Electronically signed by: Elba Stevens M.D., 8/7/2023 4:42 AM

## 2023-08-08 VITALS
RESPIRATION RATE: 16 BRPM | HEART RATE: 61 BPM | BODY MASS INDEX: 20.32 KG/M2 | DIASTOLIC BLOOD PRESSURE: 76 MMHG | TEMPERATURE: 97.8 F | WEIGHT: 150 LBS | SYSTOLIC BLOOD PRESSURE: 124 MMHG | OXYGEN SATURATION: 99 % | HEIGHT: 72 IN

## 2023-08-08 PROCEDURE — 700111 HCHG RX REV CODE 636 W/ 250 OVERRIDE (IP): Mod: JZ,UD | Performed by: EMERGENCY MEDICINE

## 2023-08-08 PROCEDURE — 96372 THER/PROPH/DIAG INJ SC/IM: CPT

## 2023-08-08 RX ORDER — DIPHENHYDRAMINE HYDROCHLORIDE 50 MG/ML
50 INJECTION INTRAMUSCULAR; INTRAVENOUS ONCE
Status: COMPLETED | OUTPATIENT
Start: 2023-08-08 | End: 2023-08-08

## 2023-08-08 RX ORDER — LORAZEPAM 2 MG/ML
2 INJECTION INTRAMUSCULAR ONCE
Status: COMPLETED | OUTPATIENT
Start: 2023-08-08 | End: 2023-08-08

## 2023-08-08 RX ORDER — ZIPRASIDONE MESYLATE 20 MG/ML
10 INJECTION, POWDER, LYOPHILIZED, FOR SOLUTION INTRAMUSCULAR ONCE
Status: DISCONTINUED | OUTPATIENT
Start: 2023-08-08 | End: 2023-08-09 | Stop reason: HOSPADM

## 2023-08-08 RX ADMIN — DIPHENHYDRAMINE HYDROCHLORIDE 50 MG: 50 INJECTION, SOLUTION INTRAMUSCULAR; INTRAVENOUS at 22:15

## 2023-08-08 RX ADMIN — LORAZEPAM 2 MG: 2 INJECTION INTRAMUSCULAR; INTRAVENOUS at 22:15

## 2023-08-08 NOTE — ED NOTES
Patient asleep in bed. NAD noted. Equal chest rise and fall. No needs or complaints at this time.  1:1 sitter outside room in direct view of patient.

## 2023-08-08 NOTE — ED NOTES
Pt sitting up in bed eating lunch tray provided with no difficulty. Pt still in view of 1:1 sitter and does not appear to be in any distress at this time.

## 2023-08-08 NOTE — ED NOTES
Primary RN attempted to update pt's vital signs, pt refused and became agitated, primary RN educated pt on importance of routine vital signs, pt stated he would consent to vital signs in the morning after he woke up

## 2023-08-08 NOTE — ED NOTES
Pt laying in bed looking at the wall, calm and cooperative, NAD, 1:1 sitter at bedside within direct view of pt

## 2023-08-08 NOTE — DISCHARGE PLANNING
Alert Team Note:    Contacted by [place], spoke to Teri. Per Teri, Dr. LOZADA has declined due to pt refusing medication.

## 2023-08-08 NOTE — ED NOTES
Pt refused nighttime medication, stated he only wanted to take his medications once he is at a facility, pt requested crackers and juice, primary RN provided snacks

## 2023-08-08 NOTE — ED NOTES
Pt laying down in bed in view of 1:1 sitter and RN station. Pt is in no acute distress and has even and unlabored breaths. Will continue to monitor.

## 2023-08-08 NOTE — ED NOTES
Pt still resting in bed in view of sitter with even and unlabored breaths. No distress noted, will continue to monitor.

## 2023-08-08 NOTE — ED NOTES
Pt sleeping on gurney, active chest rise noted, NAD, 1:1 sitter at bedside within direct view of pt

## 2023-08-08 NOTE — ED NOTES
Patient sitting up in bed. NAD noted. No needs or complaints at this time.  1:1 sitter outside room in direct view of patient.

## 2023-08-08 NOTE — ED NOTES
Pt sleeping on gurney, NAD, active chest rise noted, 1:1 sitter at bedside with direct view of pt   day(s)

## 2023-08-08 NOTE — ED NOTES
Pt laying in bed, calm and cooperative, active chest rise noted, 1:1 sitter at bedside within direct view of pt

## 2023-08-08 NOTE — ED NOTES
Pt resting in bed with eyes closed and even and unlabored breaths. No distress noted and pt is in view of 1:1 sitter. Will continue to monitor.

## 2023-08-08 NOTE — ED PROVIDER NOTES
ED Observation Progress Note    Scribed for Michelle Marte M.d. by Fish Alejandre. 8/8/2023  10:29 AM    Date of Service: 08/08/23    Interval History and Interventions  Mr. Lowry initially presented to this facility 8/5/2023 brought by EMS out of concern for psychiatric illness.  Paramedics report that he was found on the streets, and had not been taking his medication.  He stated he wanted to be seen for mental health, and was worried that he may get hit by a car due to his psychiatric issues and disorganized thoughts.  Due to severe psychosis legal hold was placed.  While in the emergency department he has been followed by psychiatry, seen yesterday by Dr. Sotelo, psychiatrist.  Abilify 5 mg was recommended, though patient declined this medication.  Remeron 7.5 mg was ordered for help with sleep, though patient declined that medication as well.  Legal hold was extended by Dr. Sotelo.     On my assessment this morning, he is calm and cooperative.  He is requesting some juice which was provided.  At this time he does not appear to be responding to any internal stimuli.    Today he presented for legal hold meeting with  via video conferencing.  Legal hold was continued for 1 week as advised by .  He awaits transfer to an inpatient psychiatric facility.    Physical Exam  /76   Pulse 61   Temp 36.6 °C (97.8 °F) (Temporal)   Resp 16   Ht 1.829 m (6')   Wt 68 kg (150 lb)   SpO2 99%   BMI 20.34 kg/m² .    Constitutional: Awake and alert. Afebrile.  HENT:  Atraumatic, normocephalic  Eyes: Normal conjunctiva, no scleral icterus  Neck: Normal and painless range of motion, supple  Cardiovascular: No tachycardia  Thorax & Lungs: Normal work of breathing, no tachypnea  Skin:  Warm, dry, intact  Extremities: No abnormal swelling nor deformity  Psychiatric: Calm and cooperative, normal speech, requesting some juice, no concerns at this time.  No evidence of active hallucinations,  no tremor, no abnormal movements    Labs  Results for orders placed or performed during the hospital encounter of 08/05/23   CoV-2, Flu A/B, And RSV by PCR (Face to Face Live)    Specimen: Nasal; Respirate   Result Value Ref Range    Influenza virus A RNA Negative Negative    Influenza virus B, PCR Negative Negative    RSV, PCR Negative Negative    SARS-CoV-2 by PCR NotDetected     SARS-CoV-2 Source NP Swab         Problem List  1. Psychosis, unspecified psychosis type (HCC) Acute  2. Paranoid schizophrenia (HCC) Acute  3. Homelessness Acute  4. Failure to thrive in adult Acute    Fish MORENO (Gorge), am scribing for, and in the presence of, Michelle Marte M.D..    Electronically signed by: Fish Alejandre (Gorge), 8/8/2023    Michelle MORENO M.D. personally performed the services described in this documentation, as scribed by Fish Alejandre in my presence, and it is both accurate and complete.     The note accurately reflects work and decisions made by me.  Michelle Marte M.D.  8/8/2023  12:00 PM

## 2023-08-08 NOTE — ED NOTES
Patient given breakfast tray. No other needs at this time.  1:1 sitter outside room in direct view of patient.

## 2023-08-08 NOTE — ED NOTES
Bedside report received from Kamini LEVI RN. Pt currently resting in bed in view of RN station and 1:1 sitter. Pt's breaths are even and unlabored at this time and no distress is noted at this time. Will continue to monitor pt while awaiting further information regarding placement.

## 2023-08-08 NOTE — CONSULTS
"[facility]  PSYCHIATRIC CONSULTATION - Follow-up  Established Patient    DOS: 08/08/23     Reason for Admission:  EMS reported that he was found on the streets and has a history of schizophrenic episodes; they report that he has not been taking his medications... he reports that he wants to be seen for mental health, since he is having \"something mental going on, not physical.\"           Legal Hold Status: on legal hold    CC:   Chief Complaint   Patient presents with    Psych Eval               S:   Reports adequate sleep, energy, appetite. Difficult to engage in assessment questions, focused on care and treatment plan. States he need to go to a psychiatric facility, when asked how that would help, he states \"this is between me, and the city of [place], Social Security said I own thirty-two thousand dollars,\" when asked about psychiatric condition patient states \"okay, well yeah, I used to have one, but Social Security said that I don't anymore.\" When asked about medications, states \"I will take them at the other facility, I don't want to end up somewhere else because I took those meds,\" suggests he has had negative interactions with medications in the past. Admits hx of incarceration \"many times.\" Denies SI/HI.     O:   Medical ROS (as pertinent):   No results for input(s): WBC, RBC, HEMOGLOBIN, HEMATOCRIT, MCV, MCH, RDW, PLATELETCT, MPV, NEUTSPOLYS, LYMPHOCYTES, MONOCYTES, EOSINOPHILS, BASOPHILS, RBCMORPHOLO in the last 72 hours.  No results for input(s): SODIUM, POTASSIUM, CHLORIDE, CO2, GLUCOSE, BUN, CPKTOTAL in the last 72 hours.  No results for input(s): ALBUMIN, TBILIRUBIN, ALKPHOSPHAT, TOTPROTEIN, ALTSGPT, ASTSGOT, CREATININE in the last 72 hours.    EKG: No results found for this or any previous visit.     MSE:   /77   Pulse (!) 48   Temp 36.4 °C (97.5 °F) (Temporal)   Resp 14   Ht 1.829 m (6')   Wt 68 kg (150 lb)   SpO2 97%     Constitutional: as noted above  General " "Appearance/Behavior: 43 y.o. appears normal habitus intermittent eye contact superficially cooperative, No behavioral disturbances  Abnormal Movements: none, no PMA/PMR or tremor observed.  Gait and Posture: not observed  Musculoskeletal: as noted above  Mood: \"alright\"  Affect: Appropriate   Speech: normal rate, normal rhythm, normal tone, normal volume, and normal fluency  Language:  spontaneous, comprehends spoken commands, and fluent   Thought Process: Goal Directed and Perseverative, Future Oriented  Thought Content: Denies SI/HI, A/VH. No e/o delusions, or internal preoccupation  Insight/Judgement:  unable to assess  Alert/Orientation: alert, only oriented to:, person, place  Attn/Concentration: short attention span  MMSE: deferred this visit     Medications:  Scheduled Medications   Medication Dose Frequency    ARIPiprazole  5 mg DAILY    mirtazapine  7.5 mg QHS       Allergies:   Allergies   Allergen Reactions    Zyprexa      \"jaw lock up\"    Geodon [ ]      \"I get lock jaw\"    Haldol [Haloperidol]      \"I get lock jaw\"    Other Drug      Pt reports having \"adverse reactions\" to \"knock off medications\" (various psych medications)  Pt reports reactions such as \"locked jaw\" and \"tongue rolling down throat\"    Paliperidone      \"jaw locks up\"    Risperdal  [Benzoic Acid-Risperidone]      \"jaw locks up\"    Risperidone And Related      \"I get lock jaw\"    Ziprasidone      Other reaction(s): Other  mouth locks up states patient        Assessment:   Diagnosis:   1. Psychosis, unspecified psychosis type (HCC) Acute   2. Paranoid schizophrenia (HCC) Acute   3. Homelessness Acute   4. Failure to thrive in adult Acute        Psychiatric:   Schizophrenia, paranoid type    Medical: as noted by the medical treatment team.      Recommendations:  Legal Status: on legal hold    Please transfer patient to inpatient psychiatric hospital when medically cleared and bed is available.    Observation status:   - Line of site with " sitter    Phone: No    Visitors: No   Personal belongings: No     Discussed/voalted: Kamini ARIAS, KRISTINA Marte MD    Medication Recommendations: Final orders as per Treatment Team  No new medication recommendations  Risks/benefits/side effects discussed, patient verbalized understanding.  Medication reconciliation was completed.    Reviewed safety plan: 911, ER, PCM, MHC, suicide crisis line, nursing staff while inpatient.    Will Continue to Follow. Thank you for the consult.       Discharge recommendations:   Please provide referrals to outpatient psychiatric services in the community.

## 2023-08-08 NOTE — ED NOTES
Patient was cooperative with VS for this RN. He was then provided with pillow for comfort. Sitter remains in doorway for observation

## 2023-08-08 NOTE — ED NOTES
Patient resting in bed, NAD noted. VSS  No needs or complaints at this time.  1:1 sitter outside room in direct view of patient.

## 2023-08-08 NOTE — ED NOTES
Bedside report received from HUGO Pruitt. Pt resting comfortably and aware of POC. Pt on RA w/ O2 available. Fall precautions in place and appropriate for pt. Pt reports no needs at this time. Appropriate equipment in room.  1:1 sitter outside room in direct view of patient.

## 2023-08-08 NOTE — PROGRESS NOTES
ED Observation Progress Note    Date of Service: 08/07/23    Interval History and Interventions  43-year-old male with a history of schizophrenia admitted on 8/5 for psychiatric evaluation.  Patient has been seen by psych and started on Abilify and Remeron.  Patient declining to take medications at this time.  No further complaints when evaluated by me today patient cooperative not requiring sedation during my shift.    Physical Exam  /77   Pulse (!) 48   Temp 36.4 °C (97.5 °F) (Temporal)   Resp 14   Ht 1.829 m (6')   Wt 68 kg (150 lb)   SpO2 97%   BMI 20.34 kg/m² .    Constitutional: Awake and alert. Nontoxic  HENT:  Grossly normal  Eyes: Grossly normal  Neck: Normal range of motion  Cardiovascular: Normal heart rate   Thorax & Lungs: No respiratory distress  Abdomen: Nontender  Skin:  No pathologic rash.   Extremities: Well perfused  Psychiatric: Affect normal    Labs  Results for orders placed or performed during the hospital encounter of 08/05/23   CoV-2, Flu A/B, And RSV by PCR ([facility])    Specimen: Nasal; Respirate   Result Value Ref Range    Influenza virus A RNA Negative Negative    Influenza virus B, PCR Negative Negative    RSV, PCR Negative Negative    SARS-CoV-2 by PCR NotDetected     SARS-CoV-2 Source NP Swab        Radiology  No orders to display       Problem List  1. Schizophrenia, legal hold, psych on board    Electronically signed by: Jovanny Dill M.D., 8/7/2023 8:32 PM

## 2023-08-08 NOTE — DISCHARGE PLANNING
Legal Hold     Referral: Legal Hold Court     Intervention: Pt presented for legal hold meeting with  via video conferencing to discuss legal options of contesting hold and meeting with the court doctors tomorrow afternoon, or not contesting legal hold and continuing the hold for one week.  advised that pt's legal hold will be be continued for one week (8/17).       Plan: Pt's legal hold has been continued for one week. Pt awaiting transfer to an in patient psych facility.

## 2023-08-08 NOTE — ED NOTES
Bedside report given to Chrissie. [facility]. Pt resting comfortably and aware of POC. Pt on RA w/ O2 available. Fall precautions in place and appropriate for pt. Pt reports no needs at this time. Appropriate equipment in room.  1:1 sitter remains outside room in direct view of patient.

## 2023-08-08 NOTE — ED NOTES
Pt sleeping on gurney, NAD, active chest rise noted, 1:1 sitter at bedside within direct view of pt

## 2023-08-09 NOTE — ED NOTES
Checked on bed, resting with unlabored respirations.  Gurney in low position, side rail up for pt safety. Patient is in direct view of 1:1 sitter, will continue to observe.

## 2023-08-09 NOTE — ED NOTES
Patient keep on refusing the meds , educated and counseled patient. ERP notified patient have been refusing medication for last 24 hours.

## 2023-08-09 NOTE — ED NOTES
Pt still resting in view of 1:1 sitter and RN station. Pt has even and unlabored breaths and is in no apparent distress.

## 2023-08-09 NOTE — ED NOTES
"Woke patient from slumber. States he is \"doing good\". Continues to refuse VS (stating \"it's a waste of time\") and Abilify. Requesting something to eat. Ambulated steadily to the restroom  "

## 2023-08-09 NOTE — ED NOTES
Checked on bed, patient refused for meds and vitals , education given , patient firm with decision of not taking meds. Pt with unlabored respirations.  Gurney in low position, side rail up for pt safety. Patient is in direct view of 1:1 sitter, will continue to observe.

## 2023-08-09 NOTE — CONSULTS
"[facility]  PSYCHIATRIC CONSULTATION - Follow-up  Established Patient    DOS: 08/09/23     Reason for Admission:  EMS reported that he was found on the streets and has a history of schizophrenic episodes; they report that he has not been taking his medications... he reports that he wants to be seen for mental health, since he is having \"something mental going on, not physical.\"           Legal Hold Status: on legal hold    CC:   Chief Complaint   Patient presents with    Psych Eval               S:   Patient observed in bed, easy to wake. Guarded presentation, continues to deny need for medications offered, expressing he won't take medication until at a psychiatric facility with trusted staff, education provided r/t anticipated hospital stay, encouraged to take medication again to facilitate transition to IP facility, patient denies need while in ED, requesting to leave if possible. Reports good sleep, energy, appetite, mood; denies SI/HI. Continues to have some delusional thinking r/t legal concerns, states \"I'll be fine, it won't be an issue.\" Denies GI distress, HA, dizziness, appears to be tolerating medications. Does not present an acute danger to self.    O:   Medical ROS (as pertinent):   No results for input(s): WBC, RBC, HEMOGLOBIN, HEMATOCRIT, MCV, MCH, RDW, PLATELETCT, MPV, NEUTSPOLYS, LYMPHOCYTES, MONOCYTES, EOSINOPHILS, BASOPHILS, RBCMORPHOLO in the last 72 hours.  No results for input(s): SODIUM, POTASSIUM, CHLORIDE, CO2, GLUCOSE, BUN, CPKTOTAL in the last 72 hours.  No results for input(s): ALBUMIN, TBILIRUBIN, ALKPHOSPHAT, TOTPROTEIN, ALTSGPT, ASTSGOT, CREATININE in the last 72 hours.    EKG: No results found for this or any previous visit.     MSE:   /76   Pulse 61   Temp 36.6 °C (97.8 °F) (Temporal)   Resp 16   Ht 1.829 m (6')   Wt 68 kg (150 lb)   SpO2 99%     Constitutional: as noted above  General Appearance/Behavior: 43 y.o. appears normal habitus intermittent eye " "contact indifferent, No behavioral disturbances  Abnormal Movements: none, no PMA/PMR or tremor observed.  Gait and Posture: not observed  Musculoskeletal: as noted above  Mood: \"fine\"  Affect: Flat   Speech: normal rate, normal rhythm, normal tone, normal volume, and normal fluency  Language:  spontaneous, comprehends spoken commands, and fluent   Thought Process: Goal Directed, Future Oriented  Thought Content: Denies SI/HI, A/VH. No e/o delusions, or internal preoccupation  Insight/Judgement:  fair  Alert/Orientation: alert, oriented to person, place and time  Attn/Concentration: short attention span  MMSE: deferred this visit     Medications:  Scheduled Medications   Medication Dose Frequency    ziprasidone  10 mg Once    ARIPiprazole  5 mg DAILY    mirtazapine  7.5 mg QHS       Allergies:   Allergies   Allergen Reactions    Zyprexa      \"jaw lock up\"    Geodon [ ]      \"I get lock jaw\"    Haldol [Haloperidol]      \"I get lock jaw\"    Other Drug      Pt reports having \"adverse reactions\" to \"knock off medications\" (various psych medications)  Pt reports reactions such as \"locked jaw\" and \"tongue rolling down throat\"    Paliperidone      \"jaw locks up\"    Risperdal  [Benzoic Acid-Risperidone]      \"jaw locks up\"    Risperidone And Related      \"I get lock jaw\"    Ziprasidone      Other reaction(s): Other  mouth locks up states patient        Assessment:   Diagnosis:   1. Psychosis, unspecified psychosis type (HCC) Acute   2. Paranoid schizophrenia (HCC) Acute   3. Homelessness Acute   4. Failure to thrive in adult Acute        Psychiatric:   Schizophrenia, paranoid type     Medical: as noted by the medical treatment team.      Recommendations:  Legal Status: discontinued     Observation status:   - Line of site with sitter     Discussed/voalted: ARRON Valencia MD     Medication Recommendations: Final orders as per Treatment Team  No medication  Risks/benefits/side effects discussed, patient verbalized " understanding.  Medication reconciliation was completed.     Reviewed safety plan: 911, ER, PCM, MHC, suicide crisis line, nursing staff while inpatient.     Will Continue to Follow. Thank you for the consult.        Discharge recommendations:   Please provide referrals to outpatient psychiatric services in the community.

## 2023-08-09 NOTE — ED NOTES
Goal Outcome Evaluation:  Plan of Care Reviewed With: patient  Progress: improving  Outcome Summary: Patient reports anxiety at 8 and depression at 8. Patient denies suicidal or homicidal ideations.  Patient calm and cooperative.   Patient is sleeping, 1:1 sitter remains outside for direct pt observation

## 2023-08-09 NOTE — ED NOTES
Hourly round done, patient resting with even and unlabored respirations. Patient is in direct view of 1:1 sitter, will continue to observe.

## 2023-08-09 NOTE — ED NOTES
Report to sitter. 1:1 Observation. Continuous visual monitoring by Trained Personnel. Sitter has unobstructed view of patient at all times. Discussion with sitter about patient care, safety and support.

## 2023-08-09 NOTE — PROGRESS NOTES
ED Observation Progress Note    Date of Service: 08/09/23    Interval History and Interventions  43-year-old male to is on a legal hold for psychiatric evaluation.  Has a history of schizophrenia, was seen by psychiatry, started on Abilify and Remeron.  Pending placement to inpatient facility    Physical Exam  /76   Pulse 61   Temp 36.6 °C (97.8 °F) (Temporal)   Resp 16   Ht 1.829 m (6')   Wt 68 kg (150 lb)   SpO2 99%   BMI 20.34 kg/m² .    Constitutional: Awake and alert. Nontoxic  HENT:  Grossly normal  Eyes: Grossly normal  Neck: Normal range of motion  Cardiovascular: Normal heart rate   Thorax & Lungs: No respiratory distress  Abdomen: Nontender  Skin:  No pathologic rash.   Extremities: Well perfused  Psychiatric: Affect normal    Labs  Results for orders placed or performed during the hospital encounter of 08/05/23   CoV-2, Flu A/B, And RSV by PCR ([facility])    Specimen: Nasal; Respirate   Result Value Ref Range    Influenza virus A RNA Negative Negative    Influenza virus B, PCR Negative Negative    RSV, PCR Negative Negative    SARS-CoV-2 by PCR NotDetected     SARS-CoV-2 Source NP Swab        Radiology  No orders to display       Problem List  1.  Schizophrenia    Electronically signed by: Vishal Valencia M.D., 8/9/2023 6:38 AM

## 2023-08-09 NOTE — ED NOTES
Assumed care of patient, patient bedside report received from RN Asley . Pt AAO X 4 , respirations even and unlabored, on room air. Introduced self as pt RN, POC discussed, call light in reach, Safety risk interventions in place. Patient is in direct view of 1:1 sitter, will continue to observe.

## 2023-08-09 NOTE — ED NOTES
Assumed care of pt, pt sleeping at this time, 1:1 sitter remains outside room for direct pt observation

## 2023-08-09 NOTE — ED NOTES
Pt resting in bed with even and unlabored breaths in view of 1:1 sitter. No distress noted. Will continue to monitor.

## 2023-08-09 NOTE — ED NOTES
Bedside report from Wilma ARIAS. Pt sleeping soundly with unlabored breathing. 1:1 sitter present in direct line of view

## 2023-08-09 NOTE — ED NOTES
Pt resting in bed in no apparent distress at this time. Pt's breaths are even and unlabored. 1:1 sitter in view of pt.

## 2023-08-10 NOTE — ED NOTES
2 belongings bags retrieved from locker 6 and given to the patient. Pt given discharge instructions regarding psychosis, follow up and reason to return to the ER. Pt verbalized understanding and signed AVS

## 2023-08-10 NOTE — DISCHARGE SUMMARY
ED Observation Discharge Summary    Patient:Dallin Lowry  Patient : 1980.  Patient MRN: [id number]  Patient PCP: Pcp Pt States None    Admit Date: 2023  Discharge Date and Time: 23 5:27 PM  Discharge Diagnosis:   1. Psychosis, unspecified psychosis type (HCC) Acute       2. Paranoid schizophrenia (HCC) Acute       3. Homelessness Acute       4. Failure to thrive in adult Acute           Discharge Attending: Darrel Hoff D.O.  Discharge Service: ED Observation    ED Course  Dallin is a 43 y.o. male who was evaluated at [facility] patient brought to the emergency department for acute psychosis, not taking his medications.  Legal hold was initiated the patient has been held on a legal hold in the emergency department for his safety.  The patient been calm and cooperative through my shift.  I received word from the alert team that the patient will be transferred to a psychiatric facility.  At the time of my evaluation the patient he is calm and cooperative, he is medically cleared for transfer to psychiatric facility.    Discharge Exam:  /76   Pulse 61   Temp 36.6 °C (97.8 °F) (Temporal)   Resp 16   Ht 1.829 m (6')   Wt 68 kg (150 lb)   SpO2 99%   BMI 20.34 kg/m² .    Constitutional: Awake and alert. Nontoxic  HENT:  Grossly normal  Eyes: Grossly normal  Neck: Normal range of motion  Cardiovascular: Normal heart rate   Thorax & Lungs: No respiratory distress  Abdomen: Nontender  Skin:  No pathologic rash.   Extremities: Well perfused  Psychiatric: Affect normal    Labs  Results for orders placed or performed during the hospital encounter of 23   CoV-2, Flu A/B, And RSV by PCR ([employer])    Specimen: Nasal; Respirate   Result Value Ref Range    Influenza virus A RNA Negative Negative    Influenza virus B, PCR Negative Negative    RSV, PCR Negative Negative    SARS-CoV-2 by PCR NotDetected     SARS-CoV-2 Source NP Swab        Radiology  No orders to display       Medications:   New  Prescriptions    No medications on file       My final assessment includes .disg    Upon Reevaluation, the patient's condition has: not improved; and will be escalated to hospitalization.    Patient discharged from ED Observation status at 8/9/2023 at 1700  Total time spent on this ED Observation discharge encounter is > 30 Minutes  This time includes discussion with alert team, discussion with the signing out physician, evaluation of the patient, and administrative tasks.    Electronically signed by: Darrel Hoff D.O., 8/9/2023 5:27 PM

## 2023-08-10 NOTE — DISCHARGE INSTRUCTIONS
He has been removed from the legal hold.  You are stable for discharge home continue your previous care.  Return if you consider harming yourself or others.

## 2023-08-11 ENCOUNTER — HOSPITAL ENCOUNTER (EMERGENCY)
Facility: MEDICAL CENTER | Age: 43
End: 2023-08-11
Attending: STUDENT IN AN ORGANIZED HEALTH CARE EDUCATION/TRAINING PROGRAM
Payer: MEDICARE

## 2023-08-11 VITALS
WEIGHT: 150 LBS | BODY MASS INDEX: 20.32 KG/M2 | OXYGEN SATURATION: 99 % | TEMPERATURE: 97.8 F | HEIGHT: 72 IN | SYSTOLIC BLOOD PRESSURE: 125 MMHG | RESPIRATION RATE: 16 BRPM | HEART RATE: 68 BPM | DIASTOLIC BLOOD PRESSURE: 71 MMHG

## 2023-08-11 DIAGNOSIS — R44.3 HALLUCINATIONS: ICD-10-CM

## 2023-08-11 LAB
AMPHET UR QL SCN: NEGATIVE
BARBITURATES UR QL SCN: NEGATIVE
BENZODIAZ UR QL SCN: NEGATIVE
BZE UR QL SCN: NEGATIVE
CANNABINOIDS UR QL SCN: NEGATIVE
ETHANOL BLD-MCNC: <10.1 MG/DL
FENTANYL UR QL: NEGATIVE
METHADONE UR QL SCN: NEGATIVE
OPIATES UR QL SCN: NEGATIVE
OXYCODONE UR QL SCN: NEGATIVE
PCP UR QL SCN: NEGATIVE
PROPOXYPH UR QL SCN: NEGATIVE

## 2023-08-11 PROCEDURE — 80307 DRUG TEST PRSMV CHEM ANLYZR: CPT

## 2023-08-11 PROCEDURE — 36415 COLL VENOUS BLD VENIPUNCTURE: CPT

## 2023-08-11 PROCEDURE — 99284 EMERGENCY DEPT VISIT MOD MDM: CPT

## 2023-08-11 PROCEDURE — 90791 PSYCH DIAGNOSTIC EVALUATION: CPT

## 2023-08-11 PROCEDURE — 82077 ASSAY SPEC XCP UR&BREATH IA: CPT

## 2023-08-11 NOTE — ED PROVIDER NOTES
Patient:Dallin Lowry  Patient : 1980  Patient MRN: [id number]  Patient PCP: Pcp Pt States None    Admit Date: 2023  Discharge Date and Time: 23 7:51 AM  Discharge Diagnosis:   1. Hallucinations            Discharge Attending: Vel Harper D.O.  Discharge Service: ED Observation    ED Course  Dallin is a 43 y.o. male who was evaluated at [facility] here for evaluation of hallucinations.  On evaluation by myself, patient has no suicidal or homicidal ideations.  He does have history of hallucinations, and this is not new.  He is noncompliant with his outpatient care.  However patient will be given resources, for discharge.  He has been seen by life skills here, and has no suicidal or homicidal ideations, in addition to my earlier partner, felt as though the patient could be discharged as well.    Discharge Exam:  /65   Pulse 71   Temp 36.1 °C (96.9 °F) (Temporal)   Resp 15   Ht 1.829 m (6')   Wt 68 kg (150 lb)   SpO2 98%   BMI 20.34 kg/m² .    Constitutional: Awake and alert. Nontoxic  HENT:  Grossly normal  Eyes: Grossly normal  Neck: Normal range of motion  Cardiovascular: Normal heart rate   Thorax & Lungs: No respiratory distress  Abdomen: Nontender  Skin:  No pathologic rash.   Extremities: Well perfused  Psychiatric: Affect normal    Labs  Results for orders placed or performed during the hospital encounter of 23   DIAGNOSTIC ALCOHOL   Result Value Ref Range    Diagnostic Alcohol <10.1 <10.1 mg/dL   URINE DRUG SCREEN   Result Value Ref Range    Amphetamines Urine Negative Negative    Barbiturates Negative Negative    Benzodiazepines Negative Negative    Cocaine Metabolite Negative Negative    Fentanyl, Urine Negative Negative    Methadone Negative Negative    Opiates Negative Negative    Oxycodone Negative Negative    Phencyclidine -Pcp Negative Negative    Propoxyphene Negative Negative    Cannabinoid Metab Negative Negative       Radiology  No orders to display        Disposition: Discharged with resource packet    Follow up: No follow-ups on file.    Medications:   New Prescriptions    No medications on file       Discharge Condition: Stable    Electronically signed by: Vel Harper D.O., 8/11/2023 7:51 AM

## 2023-08-11 NOTE — ED TRIAGE NOTES
"Dallin Lowry.  43 y.o..  male.  Chief Complaint   Patient presents with    Hallucinations     C/o hallucination. States he was having an bad dream \" so weird , everyone telling me to do stuff \" \" I am sober and clean for 2 years now.but I am having hard time sleeping. Denies SI/HI at this time.       "

## 2023-08-11 NOTE — ED NOTES
Pt report hx of paranoid schizophrenia. Pt states he is supposed to be on medication but refuses it. Pt displays pressured speech and appears anxious.

## 2023-08-11 NOTE — ED PROVIDER NOTES
"ED Provider Note    CHIEF COMPLAINT  Chief Complaint   Patient presents with    Hallucinations     C/o hallucination. States he was having an bad dream \" so weird , everyone telling me to do stuff \" \" I am sober and clean for 2 years now.but I am having hard time sleeping. Denies SI/HI at this time.       EXTERNAL RECORDS REVIEWED  Outpatient Notes per record review patient was discharged from extended ED observation on 8/9/2023 in the setting of paranoid schizophrenia.  He was discharged to psychiatric care with a diagnosis of acute psychosis.    HPI/ROS  LIMITATION TO HISTORY   Select: : None      Dallin Lowry is a 43 y.o. male who presents to the emergency department for evaluation of hallucinations.  Patient reports that he has been hearing voices since discharge from the psychiatric hospital recently.  He denies that these are telling him to kill anyone or kill himself but states that they are causing him significant distress.  He states he has not been taking any medications for this.  He denies any drug or alcohol use.    PAST MEDICAL HISTORY   has a past medical history of Auditory hallucinations, Psychiatric disorder, and Schizophrenia ([facility]) (2001).    SURGICAL HISTORY  patient denies any surgical history    FAMILY HISTORY  History reviewed. No pertinent family history.    SOCIAL HISTORY  Social History     Tobacco Use    Smoking status: Every Day     Packs/day: 0.50     Types: Cigarettes    Smokeless tobacco: Never   Vaping Use    Vaping Use: Never used   Substance and Sexual Activity    Alcohol use: No    Drug use: Not Currently     Types: Inhaled     Comment:  meth     Sexual activity: Not on file       CURRENT MEDICATIONS  Home Medications       Reviewed by Jsoe Luis Townsend R.N. (Registered Nurse) on 08/11/23 at 0109  Med List Status: Partial     Medication Last Dose Status        Patient Kevin Taking any Medications                           ALLERGIES  Allergies   Allergen Reactions    Zyprexa      \"jaw " "lock up\"    Geodon [ ]      \"I get lock jaw\"    Haldol [Haloperidol]      \"I get lock jaw\"    Other Drug      Pt reports having \"adverse reactions\" to \"knock off medications\" (various psych medications)  Pt reports reactions such as \"locked jaw\" and \"tongue rolling down throat\"    Paliperidone      \"jaw locks up\"    Risperdal  [Benzoic Acid-Risperidone]      \"jaw locks up\"    Risperidone And Related      \"I get lock jaw\"    Ziprasidone      Other reaction(s): Other  mouth locks up states patient       PHYSICAL EXAM  VITAL SIGNS: /65   Pulse 71   Temp 36.1 °C (96.9 °F) (Temporal)   Resp 15   Ht 1.829 m (6')   Wt 68 kg (150 lb)   SpO2 98%   BMI 20.34 kg/m²    Constitutional: No acute distress.  HENT: Normocephalic, Atraumatic, Bilateral external ears normal. Nose normal.   Eyes: Pupils are equal and reactive. Conjunctiva normal, non-icteric.   Heart: Regular rate and rythm,   Lungs: No respiratory distress  GI: Soft nontender nondistended   Musculoskeletal: No obvious deformity. No leg edema.  Skin: Warm, Dry, No erythema, No rash.   Neurologic: Alert and oriented x 3, Cranial nerves III through XII grossly intact no sensory deficit no cerebellar dysfunction.   Psychiatric: Hallucinations, no SI or HI      DIAGNOSTIC STUDIES / PROCEDURES  LABS  Labs Reviewed   DIAGNOSTIC ALCOHOL   URINE DRUG SCREEN       COURSE & MEDICAL DECISION MAKING    ED Observation Status? Yes; I am placing the patient in to an observation status due to a diagnostic uncertainty as well as therapeutic intensity. Patient placed in observation status at 3:57 AM, 8/11/2023.     Observation plan is as follows: Observation pending behavioral health evaluation for capacity assessment.    INITIAL ASSESSMENT, COURSE AND PLAN  Care Narrative:     Patient with a history of paranoid schizophrenia, psychosis presenting for auditory hallucinations and delusions.  Is calm and cooperative at this time.  Denies suicidal or homicidal ideation.  I do " not feel that he requires legal hold but would like him evaluated by behavioral health.  Alcohol and urine drug screen pending.    Patient's care to be signed out to oncoming provider pending behavioral health evaluation.    ADDITIONAL PROBLEM LIST  None    DISPOSITION AND DISCUSSIONS      Discussion of management with other QHP or appropriate source(s): Behavioral Health            FINAL DIAGNOSIS  1. Hallucinations           Electronically signed by: Natanael Hunt M.D., 8/11/2023 3:55 AM
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
## 2023-08-11 NOTE — ED NOTES
Pt resting in bed, pt mildly agitated s/p speaking with alert team RN, ERP coming to bedside to discuss discharge

## 2024-03-29 ENCOUNTER — HOSPITAL ENCOUNTER (EMERGENCY)
Facility: MEDICAL CENTER | Age: 44
End: 2024-03-29
Attending: EMERGENCY MEDICINE
Payer: MEDICARE

## 2024-03-29 VITALS
TEMPERATURE: 97.6 F | SYSTOLIC BLOOD PRESSURE: 127 MMHG | BODY MASS INDEX: 20.54 KG/M2 | DIASTOLIC BLOOD PRESSURE: 77 MMHG | RESPIRATION RATE: 16 BRPM | HEIGHT: 72 IN | HEART RATE: 78 BPM | WEIGHT: 151.68 LBS | OXYGEN SATURATION: 92 %

## 2024-03-29 DIAGNOSIS — F20.0 PARANOID SCHIZOPHRENIA (HCC): Primary | ICD-10-CM

## 2024-03-29 LAB
AMPHET UR QL SCN: NEGATIVE
BARBITURATES UR QL SCN: NEGATIVE
BENZODIAZ UR QL SCN: NEGATIVE
BZE UR QL SCN: NEGATIVE
CANNABINOIDS UR QL SCN: NEGATIVE
FENTANYL UR QL: NEGATIVE
METHADONE UR QL SCN: NEGATIVE
OPIATES UR QL SCN: NEGATIVE
OXYCODONE UR QL SCN: NEGATIVE
PCP UR QL SCN: NEGATIVE
POC BREATHALIZER: 0 PERCENT (ref 0–0.01)
PROPOXYPH UR QL SCN: NEGATIVE

## 2024-03-29 PROCEDURE — A9270 NON-COVERED ITEM OR SERVICE: HCPCS | Performed by: EMERGENCY MEDICINE

## 2024-03-29 PROCEDURE — 700102 HCHG RX REV CODE 250 W/ 637 OVERRIDE(OP): Performed by: EMERGENCY MEDICINE

## 2024-03-29 PROCEDURE — 80307 DRUG TEST PRSMV CHEM ANLYZR: CPT

## 2024-03-29 PROCEDURE — 302970 POC BREATHALIZER: Performed by: EMERGENCY MEDICINE

## 2024-03-29 PROCEDURE — 99284 EMERGENCY DEPT VISIT MOD MDM: CPT

## 2024-03-29 RX ORDER — LORAZEPAM 2 MG/1
2 TABLET ORAL ONCE
Status: COMPLETED | OUTPATIENT
Start: 2024-03-29 | End: 2024-03-29

## 2024-03-29 RX ADMIN — LORAZEPAM 2 MG: 2 TABLET ORAL at 02:02

## 2024-03-29 NOTE — ED TRIAGE NOTES
Chief Complaint   Patient presents with    Psych Eval     Brought by EMS from a car wash, he called them because he is saying that he is having mental breakdown, he cannot process his thoughts  He said he wants mental help    He denied SI/ HI/ AH/ VH  Blood Glucose en route: 194 mg/dL    Other     Homelessness     Pain: 0/10    Pt came in to triage ambulatory with steady gait for the above complaints.     Pt is alert and oriented x 4, speaking in full sentences, follows commands and responds appropriately to questions.     Respirations are even and unlabored.    Pt placed in lobby. Pt educated on triage process.     Pt encouraged to inform staff for any changes in condition or if needs help while waiting to be room in.    Vitals:    03/29/24 0025   BP: (!) 134/94   Pulse: 71   Resp: 19   Temp: 36.8 °C (98.2 °F)   SpO2: 94%

## 2024-03-29 NOTE — DISCHARGE SUMMARY
ED Observation Discharge Summary    Patient:Dallin Lowry  Patient : 1980  Patient MRN: [id number]  Patient PCP: Pcp Pt States None    Admit Date: 3/29/2024  Discharge Date and Time: 24 8:37 AM  Discharge Diagnosis:   1. Paranoid schizophrenia (HCC) Acute       Discharge Attending: Rafael Myers M.D.  Discharge Service: ED Observation    ED Course  Dallin is a 43 y.o. male who was evaluated at [facility] emergency department.  Patient has a history of schizophrenia.  Has done well overnight.  Resting comfortably.  Outpatient resources are provided.  Has chronic psychiatric condition.  Not in acute distress.  Discharged in improved condition.    Discharge Exam:  /77   Pulse 65   Temp 36.4 °C (97.6 °F) (Temporal)   Resp 19   Ht 1.829 m (6')   Wt 68.8 kg (151 lb 10.8 oz)   SpO2 98%   BMI 20.57 kg/m² .    Constitutional: Awake and alert. Nontoxic  HENT:  Grossly normal  Eyes: Grossly normal  Neck: Normal range of motion  Cardiovascular: Normal heart rate   Thorax & Lungs: No respiratory distress  Abdomen: Nontender  Skin:  No pathologic rash.   Extremities: Well perfused  Psychiatric: Affect normal    Labs  Results for orders placed or performed during the hospital encounter of 24   URINE DRUG SCREEN   Result Value Ref Range    Amphetamines Urine Negative Negative    Barbiturates Negative Negative    Benzodiazepines Negative Negative    Cocaine Metabolite Negative Negative    Fentanyl, Urine Negative Negative    Methadone Negative Negative    Opiates Negative Negative    Oxycodone Negative Negative    Phencyclidine -Pcp Negative Negative    Propoxyphene Negative Negative    Cannabinoid Metab Negative Negative   POC BREATHALIZER   Result Value Ref Range    POC Breathalizer 0.00 0.00 - 0.01 Percent       Radiology  No orders to display       Medications:   New Prescriptions    No medications on file       My final assessment includes   1. Paranoid schizophrenia (HCC) Acute        Upon Reevaluation, the patient's condition has: Improved; and will be discharged.    Patient discharged from ED Observation status at 8:37 AM  (Time) 3/29/2024  (Date).     Total time spent on this ED Observation discharge encounter is < 30 Minutes    Electronically signed by: Rafael Myers M.D., 3/29/2024 8:37 AM

## 2024-03-29 NOTE — DISCHARGE PLANNING
ALERT team  note:  49 year old male BIB EMS last night requesting mental health support; pt is chronically homeless; denies current oupt MH providers or psych meds; denies substance use; no SI HI, or self-harm ideation; writer RN reviewed community MH resources with  pt, with written information given, including [facility],  [facility], [facility], [facility], and the [facility] shelter; pt verbalized understanding; writer RN updated [facility] ERP Dr. Myers; pt to DC to self today    Medicare insurance plan

## 2024-03-29 NOTE — ED PROVIDER NOTES
"ED Provider Note    Scribed for Dane Neri by Lola Wilkinson. 3/29/2024  1:46 AM    Primary care provider: Pcp Pt States None  Means of arrival: EMS   History obtained from: Patient  History limited by: None    CHIEF COMPLAINT  Chief Complaint   Patient presents with    Psych Eval     Brought by EMS from a car wash, he called them because he is saying that he is having mental breakdown, he cannot process his thoughts  He said he wants mental help    He denied SI/ HI/ AH/ VH  Blood Glucose en route: 194 mg/dL    Other     Homelessness     EXTERNAL RECORDS REVIEWED  Inpatient Notes The patient was last admitted in November 2020 by [facility] for paranoid schizophrenia.    HPI/ROS  LIMITATION TO HISTORY   Select: : None  OUTSIDE HISTORIAN(S):  None.    HPI  Dallin Lowry is a 43 y.o. male who presents to the Emergency Department via EMS from a car wash for a psychiatric evaluation. Per nursing note, the patient called EMS because he is saying that he is having a mental breakdown and unable to process his thoughts. He describes that he is having \"severe, terrible mental anguish.\" The patient is requesting mental help. The patient reports that he is meant to be on Seroquel, but states that he does not take it. The patient notes that he was at a facility, heard some rumbling and decided to leave, so he has been homeless since yesterday. The patient denies any auditory hallucinations, visual hallucinations, suicidal ideation or homicidal ideation. The patient states that he has PTSD and schizoaffective disorder. The patient denies the use of alcohol, tobacco or recreational drugs.       REVIEW OF SYSTEMS  As above, all other systems reviewed and are negative.   See HPI for further details.     PAST MEDICAL HISTORY   has a past medical history of Auditory hallucinations, Psychiatric disorder, and Schizophrenia ([facility]) (2001).    SURGICAL HISTORY  patient denies any surgical " "history    SOCIAL HISTORY  Social History     Tobacco Use    Smoking status: Every Day     Current packs/day: 0.50     Types: Cigarettes    Smokeless tobacco: Never   Vaping Use    Vaping Use: Never used   Substance Use Topics    Alcohol use: No    Drug use: Not Currently     Types: Inhaled     Comment:  meth       Social History     Substance and Sexual Activity   Drug Use Not Currently    Types: Inhaled    Comment:  meth      FAMILY HISTORY  History reviewed. No pertinent family history.    CURRENT MEDICATIONS  Home Medications       Reviewed by Sabrina Sparks R.N. (Registered Nurse) on 03/29/24 at 0030  Med List Status: Partial     Medication Last Dose Status        Patient Kevin Taking any Medications                         ALLERGIES  Allergies   Allergen Reactions    Zyprexa      \"jaw lock up\"    Geodon [ ]      \"I get lock jaw\"    Haldol [Haloperidol]      \"I get lock jaw\"    Other Drug      Pt reports having \"adverse reactions\" to \"knock off medications\" (various psych medications)  Pt reports reactions such as \"locked jaw\" and \"tongue rolling down throat\"    Paliperidone      \"jaw locks up\"    Risperdal  [Benzoic Acid-Risperidone]      \"jaw locks up\"    Risperidone And Related      \"I get lock jaw\"    Ziprasidone      Other reaction(s): Other  mouth locks up states patient       PHYSICAL EXAM    VITAL SIGNS:   Vitals:    03/29/24 0025   BP: (!) 134/94   Pulse: 71   Resp: 19   Temp: 36.8 °C (98.2 °F)   TempSrc: Temporal   SpO2: 94%   Weight: 68.8 kg (151 lb 10.8 oz)   Height: 1.829 m (6')     Vitals: My interpretation: hypertensive, not tachycardic, afebrile, not hypoxic    Reinterpretation of vitals: Unchanged unremarkable    PE:   Gen: sitting comfortably, speaking clearly, appears in no acute distress   ENT: Mucous membranes moist, posterior pharynx clear, uvula midline, nares patent bilaterally   Neck: Supple, FROM  Pulmonary: Lungs are clear to auscultation bilaterally. No tachypnea  CV:  " hypertensive, RRR, no murmur appreciated, pulses 2+ in both upper and lower extremities  Abdomen: soft, NT/ND; no rebound/guarding  : no CVA or suprapubic tenderness   Neuro: A&Ox4 (person, place, time, situation), speech fluent, gait steady, no focal deficits appreciated  Skin: No rash or lesions.  No pallor or jaundice.  No cyanosis.  Warm and dry.   Psych: Mild pressured speech, disorganized thought process, alert and oriented, denies visual or auditory hallucinations, off medications, cannot remove last time took Seroquel, no SI or HI    DIAGNOSTIC STUDIES / PROCEDURES    LABS  Results for orders placed or performed during the hospital encounter of 03/29/24   URINE DRUG SCREEN   Result Value Ref Range    Amphetamines Urine Negative Negative    Barbiturates Negative Negative    Benzodiazepines Negative Negative    Cocaine Metabolite Negative Negative    Fentanyl, Urine Negative Negative    Methadone Negative Negative    Opiates Negative Negative    Oxycodone Negative Negative    Phencyclidine -Pcp Negative Negative    Propoxyphene Negative Negative    Cannabinoid Metab Negative Negative   POC BREATHALIZER   Result Value Ref Range    POC Breathalizer 0.00 0.00 - 0.01 Percent      All labs reviewed by me. Labs were compared to prior labs if they were available. Significant for negative alcohol, negative urine drug screen    COURSE & MEDICAL DECISION MAKING  Nursing notes, VS, PMSFHx, labs reviewed in chart.    ED Observation Status? Yes; I am placing the patient in to an observation status due to a diagnostic uncertainty as well as therapeutic intensity. Patient placed in observation status at 1:46 AM, 3/29/2024.     Observation plan is as follows: awaiting transfer. Patient will be reevaluated to monitor.     Upon Reevaluation, the patient's condition has: Awaiting placement, signed out to oncoming physician    Ddx: Schizophrenia, bipolar, homelessness, substance abuse    MDM: 1:46 AM Dallin Lowry is a 43 y.o.  "male who presented with evaluation of acute on chronic psychosis secondary to paranoid schizophrenia.  Complicated by psychosocial challenges including homelessness.  Patient states that he is having a mental breakdown.  Quite paranoid but not overtly delusional.  Redirectable.  Given by EMS.  Denies other drug or tobacco use.  He states that he cannot remove the last time he had his Seroquel, has been likely months he states but it was helping when he was on it.  He is asking to get placed back in inpatient psychiatry to get stabilized on his medications as he feels like he is \"spiraling out of control\".  On arrival here patient vital signs are unremarkable.  He has obvious paranoia but not overtly delusional.  Denies SI or HI.  Denies alcohol drug or tobacco use.  I do think he would benefit from inpatient psychiatry as he is having failure to self-care secondary to his paranoia and medication noncompliance.  Discussed with the behavioral health team and they agree that patient would be a good candidate for inpatient psychiatry and patient placed on ED observation while waiting a bed opened up in community for him.  He was amenable to taking medications and at first was going to give him Seroquel but he has an allergy to this so was given 2 mg p.o. dose of Ativan to help him sleep until he can be reevaluated for psychiatry placement.  Signed out to oncoming physician awaiting placement.  Vital signs and physical exam remain benign no signs of trauma.     ADDITIONAL PROBLEM LIST AND DISPOSITION    I have discussed management of the patient with the following physicians and TASIA's: None    Discussion of management with other QHP or appropriate source(s): Behavioral Health regarding psychiatric placement      Escalation of care considered, and ultimately not performed:IV fluids, Laboratory analysis, and diagnostic imaging    Barriers to care at this time, including but not limited to: Patient does not have established " PCP and Patient is homeless.     Decision tools and prescription drugs considered including, but not limited to:  None .    FINAL IMPRESSION  1. Paranoid schizophrenia (HCC) Acute      ILola (Scribe), am scribing for, and in the presence of, Dane Neri.    Electronically signed by: Lola Wilkinson (Scribe), 3/29/2024    I, Dane Neri personally performed the services described in this documentation, as scribed by Lola Wilkinson in my presence, and it is both accurate and complete.    The note accurately reflects work and decisions made by me.  Dane Neri  3/29/2024  3:15 AM

## 2024-03-29 NOTE — ED NOTES
Med Rec completed per patient   Allergies reviewed  No ORAL antibiotics in last 30 days    Patient is not taking anticoagulants     Patient states that he is suppose to take Seroquel 200 mg twice a day, but has not taken this medication in over 1 month

## 2024-03-29 NOTE — ED NOTES
.Bedside report received from off going RN Erica, assumed care of patient.  POC discussed with patient. Call light within reach, all needs addressed at this time.       Fall risk interventions in place: Move the patient closer to the nurse's station and Place socks on patient (all applicable per [facility] Fall risk assessment)   Continuous monitoring: Not Applicable   IVF/IV medications: Not Applicable   Oxygen: Room Air  Bedside sitter: Not Applicable   Isolation: Not Applicable

## 2024-03-29 NOTE — ED NOTES
Pt sleeping. Active chest rise and fall noted. Respirations equal and unlabored. Call light within reach.

## 2024-03-29 NOTE — ED NOTES
Pt reports he wants something to help him be less anxious. He states he takes Seroquel and is working on getting his dose changed. He is working with a doctor in [place], but does not have an upcoming appointment scheduled. May also need assistance with housing.

## 2024-05-17 ENCOUNTER — HOSPITAL ENCOUNTER (EMERGENCY)
Facility: MEDICAL CENTER | Age: 44
End: 2024-05-17
Attending: STUDENT IN AN ORGANIZED HEALTH CARE EDUCATION/TRAINING PROGRAM
Payer: COMMERCIAL

## 2024-05-17 VITALS
TEMPERATURE: 98.5 F | HEIGHT: 72 IN | SYSTOLIC BLOOD PRESSURE: 118 MMHG | BODY MASS INDEX: 20.45 KG/M2 | DIASTOLIC BLOOD PRESSURE: 75 MMHG | OXYGEN SATURATION: 97 % | HEART RATE: 93 BPM | RESPIRATION RATE: 16 BRPM | WEIGHT: 151 LBS

## 2024-05-17 DIAGNOSIS — F20.0 PARANOID SCHIZOPHRENIA (HCC): ICD-10-CM

## 2024-05-17 DIAGNOSIS — I10 PRIMARY HYPERTENSION: ICD-10-CM

## 2024-05-17 DIAGNOSIS — F99 PSYCHIATRIC PROBLEM: ICD-10-CM

## 2024-05-17 LAB — POC BREATHALIZER: 0 PERCENT (ref 0–0.01)

## 2024-05-17 RX ORDER — QUETIAPINE FUMARATE 100 MG/1
200 TABLET, FILM COATED ORAL ONCE
Status: COMPLETED | OUTPATIENT
Start: 2024-05-17 | End: 2024-05-17

## 2024-05-17 RX ORDER — QUETIAPINE FUMARATE 200 MG/1
200 TABLET, FILM COATED ORAL
Qty: 30 TABLET | Refills: 0 | Status: SHIPPED | OUTPATIENT
Start: 2024-05-17

## 2024-05-17 RX ADMIN — QUETIAPINE FUMARATE 200 MG: 100 TABLET ORAL at 08:15

## 2024-05-17 NOTE — DISCHARGE SUMMARY
ED Observation Discharge Summary    Patient:Dallin Lowry  Patient : 1980  Patient MRN: [id number]  Patient PCP: Pcp Pt States None    Admit Date: 2024  Discharge Date and Time: 24 8:20 AM  Discharge Diagnosis:   1. Psychiatric problem Acute   2. Primary hypertension Chronic   3. Paranoid schizophrenia (HCC) Chronic       Discharge Attending: Rafael Myers M.D.  Discharge Service: ED Observation    ED Course  Dallin is a 44 y.o. male who was evaluated at [facility] emergency department requesting psychiatric evaluation.  Patient does not have any suicidal ideation.  He has been off of his medication.  Has a history of schizophrenia.  Feeling like his psychiatric problems acting out.  Denies suicidal ideation.  No homicidal ideation.    The patient was evaluated by Fide from the behavioral health team.  We both agree that this patient does not meet legal 2000 criteria.  She has reviewed resources and plan of care with him.  I have sent a prescription to the [facility] pharmacy to be delivered to the [facility] for him.  He has outpatient resources.  He knows where to follow-up.  He has a bed available to him.  Patient is discharged    Discharge Exam:  BP (!) 146/99   Pulse 67   Temp 36.8 °C (98.3 °F) (Temporal)   Resp 18   Ht 1.829 m (6')   Wt 68.5 kg (151 lb)   SpO2 99%   BMI 20.48 kg/m² .    Constitutional: Awake and alert. Nontoxic  HENT:  Grossly normal  Eyes: Grossly normal  Neck: Normal range of motion  Cardiovascular: Normal heart rate   Thorax & Lungs: No respiratory distress  Abdomen: Nontender  Skin:  No pathologic rash.   Extremities: Well perfused  Psychiatric: Affect normal    Labs  Results for orders placed or performed during the hospital encounter of 24   POC BREATHALIZER   Result Value Ref Range    POC Breathalizer 0.00 0.00 - 0.01 Percent       Radiology  No orders to display       Medications:   New Prescriptions    No medications on file       My final  assessment includes   1. Psychiatric problem Acute   2. Primary hypertension Chronic   3. Paranoid schizophrenia (HCC) Chronic       Upon Reevaluation, the patient's condition has: Improved; and will be discharged.    Patient discharged from ED Observation status at 8:23 AM  (Time) 5/17/2024  (Date).     Total time spent on this ED Observation discharge encounter is < 30 Minutes    Electronically signed by: Rafael Myers M.D., 5/17/2024 8:20 AM

## 2024-05-17 NOTE — DISCHARGE PLANNING
ALERT team  note:  44 year old male BIB EMS requesting a psychiatric evaluation and medication; states he returned to [place] 3/2024 after going to [place], [place] for approx 6 months with transport by bus; states he plans on staying in [place] but has not followed up with outpt medical or MH providers; states current psych meds, which he has not taken in 6 months, include Seroquel 200 mgPO daily; he is homeless, staying at the [facility] homeless shelter; states he receives SSD monthly payments but does not know how much he receives a month as he states he has a payee who helps manage his money; he receives money weekly on a [employer] bank card;  he denies substance use; pt alert, oriented x 4, but confused to some recent event and with some thought delay when answering questions; calm; cooperative; pleasant; with organized thoughts and behaviors; no delusions or paranoia noted; he is not responding to internal stimuli ut appears internally preoccupied; insight, judgment adequate; currently denies SI, HI, or self-harm ideation; future-oriented; writer RN reviewed community  and homeless resources with  pt, with written information given, including [facility],  [facility], [facility], [facility], [facility], the [facility], [place] [facility], and [facility]; writer RN encouraged pt [facility] to schedule outpt MH appts and med mgmt and the [facility] shelter; pt verbalized understanding; writer RN updated  [facility] ERP, Dr. Myers; pt to DC to self today with transport arranged by [facility] ED staff;    PT received Seroquel 200 mg PO today at 0815    Medicare insurance plan

## 2024-05-17 NOTE — ED PROVIDER NOTES
"ED Provider Note    CHIEF COMPLAINT  Chief Complaint   Patient presents with    Anxiety    Behavioral Problem     PT BIBA for anxiety and abnormal behavior. Pt was discharged from [facility] today for same and they didn't do anything. Pt stated he currently does not have any SI thoughts, plan or intent to do anything. Pt would like to talk to someone that could help get him back on his medication.        EXTERNAL RECORDS REVIEWED  External ED Note ED visit from today at outside hospital, [facility].  Patient was seen for schizophrenia and requesting him psychiatric evaluation.  Does not appear that he was assessed to be on triage.  He also has previous documentation of paranoid schizophrenia and transferred to psychiatric facility for stabilization.    HPI/ROS  LIMITATION TO HISTORY   Select: : None  OUTSIDE HISTORIAN(S):  EMS vital signs stable.  Calm in route.    Dallin Lowry is a 44 y.o. male who presents by EMS with report of a panic attack.  Patient was walking downtown when he began feeling immense panic suddenly.  He arrives by EMS and had voiced SI to them.  Here to me and the bedside nurse patient is denying active SI or HI.  Denying delusions at this time.  He reports previous panic attacks, notes his current calm demeanor is \"all of facade and I need a psychiatric evaluation\" patient notes formally being on Seroquel 200 mg for schizophrenia with paranoid delusions.  Tells me the paranoia was exacerbated by alcohol or drugs and currently he is no longer on either.  Has not been on Seroquel for some time and subjectively feels that his behavioral/psychiatric problems have been better without.  He is requesting behavioral health assessment and he is requesting to go to psychiatric facility.  When asked if he would be willing to start medications he is reluctant.    PAST MEDICAL HISTORY   has a past medical history of Auditory hallucinations, Bipolar 1 disorder, manic, moderate (HCC), Psychiatric disorder, " "and Schizophrenia ([facility]) (2001).    SURGICAL HISTORY  patient denies any surgical history    FAMILY HISTORY  No family history on file.    SOCIAL HISTORY  Social History     Tobacco Use    Smoking status: Every Day     Current packs/day: 0.50     Types: Cigarettes    Smokeless tobacco: Never   Vaping Use    Vaping status: Never Used   Substance and Sexual Activity    Alcohol use: No    Drug use: Not Currently     Types: Inhaled     Comment:  meth     Sexual activity: Not on file       CURRENT MEDICATIONS  Home Medications    **Home medications have not yet been reviewed for this encounter**         ALLERGIES  Allergies   Allergen Reactions    Geodon [ ] Unspecified     \"I get lock jaw\"    Haldol [Haloperidol] Unspecified     \"I get lock jaw\"    Other Drug Unspecified     Pt reports having \"adverse reactions\" to \"knock off medications\" (various psych medications)  Pt reports reactions such as \"locked jaw\" and \"tongue rolling down throat\"    Paliperidone Unspecified     \"jaw locks up\"    Risperdal  [Benzoic Acid-Risperidone] Unspecified     \"jaw locks up\"    Ziprasidone Unspecified     mouth locks up states patient    Zyprexa Unspecified     \"jaw lock up\"       PHYSICAL EXAM  VITAL SIGNS: BP (!) 146/99   Pulse 67   Temp 36.8 °C (98.3 °F) (Temporal)   Resp 18   Ht 1.829 m (6')   Wt 68.5 kg (151 lb)   SpO2 99%   BMI 20.48 kg/m²    Constitutional: Awake and alert. No acute distress.  Head: NCAT.  HEENT: Normal Conjunctiva. PERRLA.  Neck: Grossly normal range of motion. Airway midline.  Cardiovascular: Normal heart rate, Normal rhythm.  Thorax & Lungs: No respiratory distress. Clear to Auscultation bilaterally.  Abdomen: Normal inspection. Nontender. Nondistended  Skin: No obvious rash.  Back: No tenderness, No CVA tenderness.   Musculoskeletal: No obvious deformity. Moves all extremities Well.  Neurologic: A&Ox3.  Ambulatory  Psychiatric: Denying SI or HI.  Not exhibiting paranoid thoughts or delusions.  Discussion " is mostly linear and coherent.    EKG/LABS  Results for orders placed or performed during the hospital encounter of 03/29/24   URINE DRUG SCREEN   Result Value Ref Range    Amphetamines Urine Negative Negative    Barbiturates Negative Negative    Benzodiazepines Negative Negative    Cocaine Metabolite Negative Negative    Fentanyl, Urine Negative Negative    Methadone Negative Negative    Opiates Negative Negative    Oxycodone Negative Negative    Phencyclidine -Pcp Negative Negative    Propoxyphene Negative Negative    Cannabinoid Metab Negative Negative   POC BREATHALIZER   Result Value Ref Range    POC Breathalizer 0.00 0.00 - 0.01 Percent     COURSE & MEDICAL DECISION MAKING    ASSESSMENT, COURSE AND PLAN  Care Narrative:   44-year-old male history of paranoid schizophrenia and prior substance use here by EMS for report of panic attack.  Afebrile and hypertensive  On exam calm, no respiratory distress.  He is denying SI or HI.  He is not responding to internal stimuli.  Exhibits mostly linear and coherent thinking.  Breathalyzer 0.00  He is requesting psychiatric evaluation and we will coordinate a behavioral health assessment.  No indication for emergency psychiatric medications at this time.  He is not endorsing any somatic or physical complaints for assessment and given normal vital signs he is medically cleared  He is pending behavioral health assessment at the time of signout.  Final disposition will be based on recommendations by behavioral health specialist and reevaluation by oncoming physician.    ED OBS: Yes; I am placing the patient in to an observation status due to a diagnostic uncertainty as well as therapeutic intensity. Patient placed in observation status at 3:38 AM, 5/17/2024.     Observation plan is as follows: Patient will remain in the ED until assessed by behavioral health.  He is here voluntarily and not under legal hold.  Final disposition pending oncoming physician assessment and  behavioral health recommendations.    ADDITIONAL PROBLEMS MANAGED    hypertension    DISPOSITION AND DISCUSSIONS  I have discussed management of the patient with the following physicians and TASIA's:  none    Discussion of management with other [facility] or appropriate source(s): Behavioral Health pending evaluation      Escalation of care considered, and ultimately not performed:Laboratory analysis    Barriers to care at this time, including but not limited to: Patient does not have established PCP, Patient is homeless, Patient lacks transportation , Patient does not have insurance, Patient had difficult affording medications, and Patient lacks financial resources.     Decision tools and prescription drugs considered including, but not limited to:  none .    FINAL DIAGNOSIS  1. Psychiatric problem Acute   2. Primary hypertension Chronic   3. Paranoid schizophrenia (HCC) Chronic          Electronically signed by: Jorge Hutton D.O., 5/17/2024 3:54 AM

## 2024-05-17 NOTE — ED NOTES
Patient is stable for discharge at this time, anticipatory guidance provided, close follow-up is encouraged, and ED return instructions have been detailed. Patient is both agreeable to the disposition and plan and discharged home in ambulatory state and in good condition.     Rx education provided, Pt verbalized understanding;     Cab voucher provided by CHADWICK Roque;

## 2024-05-17 NOTE — ED NOTES
Pt given [employer] & [employer] dinner per his request, Pt denied having any other needs at this time, no distress noted;

## 2024-05-17 NOTE — ED NOTES
Pt rounded on and let known that Zenaida from alert team is finishing up with a pt and then they will talk to you. Pt verbalized understanding and has no needs at this time

## 2024-05-17 NOTE — ED TRIAGE NOTES
Chief Complaint   Patient presents with    Anxiety    Behavioral Problem     PT BIBA for anxiety and abnormal behavior. Pt was discharged from [facility] today for same and they didn't do anything. Pt stated he currently does not have any SI thoughts, plan or intent to do anything. Pt would like to talk to someone that could help get him back on his medication.      BP (!) 146/99   Pulse 67   Temp 36.8 °C (98.3 °F) (Temporal)   Resp 18   Ht 1.829 m (6')   Wt 68.5 kg (151 lb)   SpO2 99%   BMI 20.48 kg/m²     ERP at bedside

## 2024-05-20 ENCOUNTER — HOSPITAL ENCOUNTER (EMERGENCY)
Facility: MEDICAL CENTER | Age: 44
End: 2024-05-20
Attending: EMERGENCY MEDICINE
Payer: COMMERCIAL

## 2024-05-20 VITALS
RESPIRATION RATE: 18 BRPM | HEIGHT: 72 IN | HEART RATE: 68 BPM | DIASTOLIC BLOOD PRESSURE: 90 MMHG | WEIGHT: 152.12 LBS | SYSTOLIC BLOOD PRESSURE: 138 MMHG | OXYGEN SATURATION: 98 % | TEMPERATURE: 98.4 F | BODY MASS INDEX: 20.6 KG/M2

## 2024-05-20 DIAGNOSIS — R44.0 AUDITORY HALLUCINATION: Primary | ICD-10-CM

## 2024-05-20 RX ORDER — QUETIAPINE FUMARATE 100 MG/1
200 TABLET, FILM COATED ORAL ONCE
Status: DISCONTINUED | OUTPATIENT
Start: 2024-05-20 | End: 2024-05-20

## 2024-05-20 NOTE — ED PROVIDER NOTES
"ED Provider Note    CHIEF COMPLAINT  Chief Complaint   Patient presents with    Hallucinations       EXTERNAL RECORDS REVIEWED  [facility] ER notes. He presented with request for psychiatric evaluation.He described auditory hallucinations. No SI or HI. Did not meet legal hold criteria. Given outpatient resources. He refused medication.     HPI/ROS    Dallin Lowry is a 44 y.o. male who presents with concerns of auditory hallucinations.  He says he has had these symptoms for a \"quarter of a century\".  He is requesting inpatient psychiatric care.  He says medications do not work.  He has been prescribed Seroquel recently and has not taken it.  He says he went to [facility] yesterday but was not admitted.  Denies any attempts at harming himself or others.  Not having command hallucinations.    PAST MEDICAL HISTORY   has a past medical history of Auditory hallucinations, Bipolar 1 disorder, manic, moderate (HCC), Psychiatric disorder, and Schizophrenia ([facility]) (2001).    SURGICAL HISTORY  patient denies any surgical history    FAMILY HISTORY  History reviewed. No pertinent family history.    SOCIAL HISTORY  Social History     Tobacco Use    Smoking status: Former     Current packs/day: 0.50     Types: Cigarettes    Smokeless tobacco: Never   Vaping Use    Vaping status: Never Used   Substance and Sexual Activity    Alcohol use: No    Drug use: Not Currently     Types: Inhaled     Comment:  meth     Sexual activity: Not on file       CURRENT MEDICATIONS  Home Medications       Reviewed by Leigh Figueroa R.N. (Registered Nurse) on 05/19/24 at 2202  Med List Status: Complete     Medication Last Dose Status   QUEtiapine (SEROQUEL) 200 MG Tab not taking Active                    ALLERGIES  Allergies   Allergen Reactions    Geodon [ ] Unspecified     \"I get lock jaw\"    Haldol [Haloperidol] Unspecified     \"I get lock jaw\"    Other Drug Unspecified     Pt reports having \"adverse reactions\" to \"knock off " "medications\" (various psych medications)  Pt reports reactions such as \"locked jaw\" and \"tongue rolling down throat\"    Paliperidone Unspecified     \"jaw locks up\"    Risperdal  [Benzoic Acid-Risperidone] Unspecified     \"jaw locks up\"    Ziprasidone Unspecified     mouth locks up states patient    Zyprexa Unspecified     \"jaw lock up\"       PHYSICAL EXAM  VITAL SIGNS: BP (!) 138/90   Pulse 68   Temp 36.9 °C (98.4 °F) (Temporal)   Resp 18   Ht 1.816 m (5' 11.5\")   Wt 69 kg (152 lb 1.9 oz)   SpO2 98%   BMI 20.92 kg/m²    Physical Exam  Vitals and nursing note reviewed.   Constitutional:       Appearance: Normal appearance.   Cardiovascular:      Rate and Rhythm: Normal rate.   Pulmonary:      Effort: Pulmonary effort is normal.   Neurological:      General: No focal deficit present.      Mental Status: He is alert.   Psychiatric:      Comments: Reports hearing auditory hallucinations but does not seem to be responding to internal stimuli.  Able to have a clear and linear conversation with him.  No SI or HI         COURSE & MEDICAL DECISION MAKING    ASSESSMENT, COURSE AND PLAN  Care Narrative: Dallin Lowry is a 44 year old man with schizophrenia who presents with concerns of persistent auditory hallucinations. He has linear thought process. Not suicidal or homicidal. I am able to have a reasonable conversation with him and he is very concerned about persistent auditory hallucinations. He wants to be treated inpatient. He is refusing medication right now because it has not worked in the past, specifically seroquel.  He has not medical complaints, no recent injuries. I spoke with our behavioral health nurse, Ann. He is able to go to [facility] voluntarily this evening. Ann and I agree that he does not meet criteria for involuntary hold. We will help him with transportation to [facility].           PROBLEMS MANAGED  # Auditory hallucinations    DISPOSITION AND DISCUSSIONS  Discussion of management with " other [facility] or appropriate source(s): Behavioral healthAnn RN        FINAL DIAGNOSIS  1. Auditory hallucination Active          Electronically signed by: Scooter Ha II, M.D., 5/20/2024 12:49 AM

## 2024-05-20 NOTE — ED NOTES
Vital signs taken and recorded. Discharge in stable condition ambulatory . Health teachings given to patient  with full understanding of the information given. No personal belongings left.      Pt. Provided with cab voucher.

## 2024-05-20 NOTE — ED TRIAGE NOTES
Patient to ED with complaints of hallucinations ongoing for several year, worse in last 6 months. Hx of schizophrenia. Denies SI or HI. Not taking medication at this time.

## 2024-05-20 NOTE — DISCHARGE PLANNING
Alert Team:    Patient provided with OP resources to establish care, [facility] as well as flyer for [facility] and [facility] voucher #[id number] to complete free intake assessment for voluntary admission. Patient denies SI/HI or command-type hallucinations. 3rd ER visit, both [facility] and [facility], in the past 2 days for similar presentation. Patient does not meet legal hold criteria at this time.

## 2024-07-31 ENCOUNTER — HOSPITAL ENCOUNTER (EMERGENCY)
Facility: MEDICAL CENTER | Age: 44
End: 2024-07-31
Attending: STUDENT IN AN ORGANIZED HEALTH CARE EDUCATION/TRAINING PROGRAM
Payer: COMMERCIAL

## 2024-07-31 VITALS
RESPIRATION RATE: 17 BRPM | SYSTOLIC BLOOD PRESSURE: 158 MMHG | HEIGHT: 71 IN | OXYGEN SATURATION: 98 % | DIASTOLIC BLOOD PRESSURE: 106 MMHG | HEART RATE: 66 BPM | BODY MASS INDEX: 21.28 KG/M2 | WEIGHT: 152 LBS | TEMPERATURE: 97.2 F

## 2024-07-31 DIAGNOSIS — R44.0 AUDITORY HALLUCINATIONS: ICD-10-CM

## 2024-07-31 PROCEDURE — 99284 EMERGENCY DEPT VISIT MOD MDM: CPT

## 2024-07-31 NOTE — ED TRIAGE NOTES
"Chief Complaint   Patient presents with    Psych Eval     BIB EMS, pt LWBS at [facility] and called EMS due to having paranoia and anxiety. Pt reports that he does not take any medications but does have schizophrenia. Denies HI/SI.      BP (!) 158/106   Pulse 66   Temp 36.2 °C (97.2 °F) (Temporal)   Resp 17   Ht 1.803 m (5' 11\")   Wt 68.9 kg (152 lb)   SpO2 98%   BMI 21.20 kg/m²     Pt requesting that we help him find housing and a job since he came to [place] 7 years ago and has not been able to find either and is currently homeless. Chart up for ERP.   "

## 2024-07-31 NOTE — ED PROVIDER NOTES
ED Provider Note    CHIEF COMPLAINT  Chief Complaint   Patient presents with    Psych Eval     BIB EMS, pt LWBS at [facility] and called EMS due to having paranoia and anxiety. Pt reports that he does not take any medications but does have schizophrenia. Denies HI/SI.        EXTERNAL RECORDS REVIEWED  External ED Note patient was seen at [facility] prior to arrival.  He presented with an episode of paranoia that was triggered by the change after arriving from [facility].  No hallucinations.  No SI or HI    HPI/ROS  LIMITATION TO HISTORY   None  OUTSIDE HISTORIAN(S):  None    Dallin Lowry is a 44 y.o. male who presents for hallucinations.  Patient says that this has been an ongoing issue for him.  He says that he cannot afford his medications.  He says that they are too expensive and he does not want to take Seroquel or the other more common regimens because they make him 'too sleepy'.  He denies any command auditory hallucinations.  He says if he ever would hear a voice telling him to hurt himself he would come to the ER and seek help.  He denies any thoughts of hurting himself or others.  He has no medical complaints today specifically no headache, chest pain or recent illness patient denies any auditory hallucinations.      PAST MEDICAL HISTORY   has a past medical history of Auditory hallucinations, Bipolar 1 disorder, manic, moderate (HCC), Psychiatric disorder, and Schizophrenia ([facility]) (2001).    SURGICAL HISTORY  patient denies any surgical history    FAMILY HISTORY  History reviewed. No pertinent family history.    SOCIAL HISTORY  Social History     Tobacco Use    Smoking status: Former     Current packs/day: 0.50     Types: Cigarettes    Smokeless tobacco: Never   Vaping Use    Vaping status: Never Used   Substance and Sexual Activity    Alcohol use: No    Drug use: Not Currently     Types: Inhaled     Comment:  meth     Sexual activity: Not on file       CURRENT MEDICATIONS  Home Medications        "Reviewed by Humera Aguirre R.N. (Registered Nurse) on 07/31/24 at 0139  Med List Status: Partial     Medication Last Dose Status   QUEtiapine (SEROQUEL) 200 MG Tab  Active                    ALLERGIES  Allergies   Allergen Reactions    Geodon [ ] Unspecified     \"I get lock jaw\"    Haldol [Haloperidol] Unspecified     \"I get lock jaw\"    Other Drug Unspecified     Pt reports having \"adverse reactions\" to \"knock off medications\" (various psych medications)  Pt reports reactions such as \"locked jaw\" and \"tongue rolling down throat\"    Paliperidone Unspecified     \"jaw locks up\"    Risperdal  [Benzoic Acid-Risperidone] Unspecified     \"jaw locks up\"    Ziprasidone Unspecified     mouth locks up states patient    Zyprexa Unspecified     \"jaw lock up\"       PHYSICAL EXAM  VITAL SIGNS: BP (!) 158/106   Pulse 66   Temp 36.2 °C (97.2 °F) (Temporal)   Resp 17   Ht 1.803 m (5' 11\")   Wt 68.9 kg (152 lb)   SpO2 98%   BMI 21.20 kg/m²    Constitutional: Awake and alert. Nontoxic  HENT:  Grossly normal  Eyes: Grossly normal  Neck: Normal range of motion  Cardiovascular: Normal heart rate   Thorax & Lungs: No respiratory distress  Abdomen: Nontender  Skin:  No pathologic rash.   Extremities: Well perfused  Psychiatric: He denies SI/HI        COURSE & MEDICAL DECISION MAKING    ASSESSMENT, COURSE AND PLAN  Care Narrative: This is a 44-year-old with a history of schizophrenia who presents with auditory hallucinations.  He does not appear to be responding to internal stimuli on exam and is quite linear and conversant.  He denies any SI or HI.  Patient says that he does not take his medications.  He declines any medications and says that they do not typically work for him.  He has no medical complaints and I have low suspicion for another metabolic derangement or other emergent pathology that would require further lab work or imaging.  He does not meet criteria for an involuntary hold.  He declined any resources for outpatient " management.  Patient requesting to leave the ER which I think is reasonable no signs to suggest psychiatric emergency at this time.  He was discharged in good condition.      DISPOSITION AND DISCUSSIONS  I have discussed management of the patient with the following physicians and TASIA's:  None    Discussion of management with other [facility] or appropriate source(s): [facility]     Escalation of care considered, and ultimately not performed:Laboratory analysis    Barriers to care at this time, including but not limited to: Patient is homeless and Patient had difficult affording medications.     Decision tools and prescription drugs considered including, but not limited to:  None .    FINAL DIAGNOSIS  1. Auditory hallucinations Acute        Electronically signed by: Debbie Garnett M.D., 7/31/2024 1:43 AM

## 2024-08-01 ENCOUNTER — HOSPITAL ENCOUNTER (EMERGENCY)
Facility: MEDICAL CENTER | Age: 44
End: 2024-08-02
Attending: STUDENT IN AN ORGANIZED HEALTH CARE EDUCATION/TRAINING PROGRAM
Payer: COMMERCIAL

## 2024-08-01 DIAGNOSIS — F23 ACUTE PSYCHOSIS (HCC): ICD-10-CM

## 2024-08-01 DIAGNOSIS — F20.0 PARANOID SCHIZOPHRENIA (HCC): ICD-10-CM

## 2024-08-01 PROCEDURE — 99284 EMERGENCY DEPT VISIT MOD MDM: CPT

## 2024-08-01 PROCEDURE — RXMED WILLOW AMBULATORY MEDICATION CHARGE: Performed by: STUDENT IN AN ORGANIZED HEALTH CARE EDUCATION/TRAINING PROGRAM

## 2024-08-01 RX ORDER — QUETIAPINE FUMARATE 25 MG/1
25 TABLET, FILM COATED ORAL ONCE
Status: COMPLETED | OUTPATIENT
Start: 2024-08-02 | End: 2024-08-02

## 2024-08-01 RX ORDER — QUETIAPINE FUMARATE 200 MG/1
200 TABLET, FILM COATED ORAL
Qty: 30 TABLET | Refills: 0 | Status: SHIPPED | OUTPATIENT
Start: 2024-08-01

## 2024-08-02 ENCOUNTER — PHARMACY VISIT (OUTPATIENT)
Dept: PHARMACY | Facility: MEDICAL CENTER | Age: 44
End: 2024-08-02
Payer: COMMERCIAL

## 2024-08-02 ENCOUNTER — HOSPITAL ENCOUNTER (EMERGENCY)
Facility: MEDICAL CENTER | Age: 44
End: 2024-08-02
Attending: EMERGENCY MEDICINE
Payer: COMMERCIAL

## 2024-08-02 VITALS
DIASTOLIC BLOOD PRESSURE: 81 MMHG | SYSTOLIC BLOOD PRESSURE: 117 MMHG | WEIGHT: 151 LBS | HEIGHT: 71 IN | BODY MASS INDEX: 21.14 KG/M2 | OXYGEN SATURATION: 97 % | HEART RATE: 90 BPM | RESPIRATION RATE: 16 BRPM | TEMPERATURE: 96.6 F

## 2024-08-02 VITALS
SYSTOLIC BLOOD PRESSURE: 130 MMHG | OXYGEN SATURATION: 94 % | HEIGHT: 71 IN | TEMPERATURE: 97.7 F | WEIGHT: 151.9 LBS | HEART RATE: 65 BPM | BODY MASS INDEX: 21.27 KG/M2 | RESPIRATION RATE: 14 BRPM | DIASTOLIC BLOOD PRESSURE: 75 MMHG

## 2024-08-02 DIAGNOSIS — Z86.59 HISTORY OF SCHIZOPHRENIA: ICD-10-CM

## 2024-08-02 DIAGNOSIS — Z76.0 MEDICATION REFILL: ICD-10-CM

## 2024-08-02 PROCEDURE — A9270 NON-COVERED ITEM OR SERVICE: HCPCS | Performed by: STUDENT IN AN ORGANIZED HEALTH CARE EDUCATION/TRAINING PROGRAM

## 2024-08-02 PROCEDURE — 700102 HCHG RX REV CODE 250 W/ 637 OVERRIDE(OP): Performed by: STUDENT IN AN ORGANIZED HEALTH CARE EDUCATION/TRAINING PROGRAM

## 2024-08-02 PROCEDURE — 99281 EMR DPT VST MAYX REQ PHY/QHP: CPT

## 2024-08-02 RX ADMIN — QUETIAPINE FUMARATE 25 MG: 25 TABLET ORAL at 00:25

## 2024-08-02 NOTE — ED NOTES
Checked on bed, with unlabored respirations. No safety risk noted  Sleeping  Continued safety precaution  Gurney in low position, side rail up for pt safety.   No needs identified at the moment

## 2024-08-02 NOTE — ED TRIAGE NOTES
"Chief Complaint   Patient presents with    Medication Refill     Pt here last night for \"psychosis and acute schizophrenia\", given Seroquel with Rx for same, states unable to obtain meds from pharm, insists on being seen by ERP today and assistance getting meds        Ambulated to triage for above complaint. No HI/SI.     Pt educated of triage process and informed to contact staff if situation changes.    /81   Pulse 90   Temp 35.9 °C (96.6 °F) (Temporal)   Resp 16   Ht 1.803 m (5' 11\")   Wt 68.5 kg (151 lb)   SpO2 97%   BMI 21.06 kg/m²      "

## 2024-08-02 NOTE — ED TRIAGE NOTES
"Chief Complaint   Patient presents with    Psych Eval     BIBA for initial c/o anxiety. ''Its the atmosphere, its just more than I expected, I am not trying to be out there, yeah, I'm homeless, not part of the night life''     BP (!) 130/96   Pulse 89   Temp 37.2 °C (98.9 °F) (Temporal)   Resp 12   Ht 1.803 m (5' 11\")   Wt 68.9 kg (151 lb 14.4 oz)   SpO2 97%   BMI 21.19 kg/m²     Pt BIBA to triage, initially for c/o anxiety  Pt just reports to this RN he is depressed and then some incoherent speech quoted above. EMS reports he was anxious en route but denies it currently   Pt adamantly denies SI/HI  EMS and pt verbalize he does not want to take prescribed seroquel and wants something different as it makes him feel too tired  Pt asking if he can stay for a while and wanting new meds  Many recent visits between [facility] and [facility]     Pt ambulatory to [facility], educated on rooming process   "

## 2024-08-02 NOTE — ED PROVIDER NOTES
ED Provider Note    CHIEF COMPLAINT  Chief Complaint   Patient presents with    Psych Eval     BIBA for initial c/o anxiety. ''Its the atmosphere, its just more than I expected, I am not trying to be out there, yeah, I'm homeless, not part of the night life''       EXTERNAL RECORDS REVIEWED  Outpatient Notes patient was seen in the emergency department here 7/31/2024 for psychiatric evaluation in the setting of paranoia and anxiety.  He seems to be at his baseline at that time and was declining medication.    HPI/ROS  LIMITATION TO HISTORY   Select: : None      Dallin Lowry is a 44 y.o. male who presents to the emergency department with complaint of anxiety.  The patient reports that being homeless makes it very difficult for him to take his psychiatric medications.  He states that he does not currently have access to them.  He reports occasional auditory hallucinations but states that they are not debilitating to him.  He denies any suicidal or homicidal ideation.  He reports that he stays at [facility] primarily.  He denies any additional complaints currently.    PAST MEDICAL HISTORY   has a past medical history of Auditory hallucinations, Bipolar 1 disorder, manic, moderate (HCC), Psychiatric disorder, and Schizophrenia ([facility]) (2001).    SURGICAL HISTORY  patient denies any surgical history    FAMILY HISTORY  History reviewed. No pertinent family history.    SOCIAL HISTORY  Social History     Tobacco Use    Smoking status: Former     Current packs/day: 0.50     Types: Cigarettes    Smokeless tobacco: Never   Vaping Use    Vaping status: Never Used   Substance and Sexual Activity    Alcohol use: No    Drug use: Not Currently     Types: Inhaled     Comment:  meth     Sexual activity: Not on file       CURRENT MEDICATIONS  Home Medications       Reviewed by Heather Salmon R.N. (Registered Nurse) on 08/01/24 at 3417  Med List Status: Partial     Medication Last Dose Status   QUEtiapine (SEROQUEL) 200 MG Tab  " Active                    ALLERGIES  Allergies   Allergen Reactions    Geodon [ ] Unspecified     \"I get lock jaw\"    Haldol [Haloperidol] Unspecified     \"I get lock jaw\"    Other Drug Unspecified     Pt reports having \"adverse reactions\" to \"knock off medications\" (various psych medications)  Pt reports reactions such as \"locked jaw\" and \"tongue rolling down throat\"    Paliperidone Unspecified     \"jaw locks up\"    Risperdal  [Benzoic Acid-Risperidone] Unspecified     \"jaw locks up\"    Ziprasidone Unspecified     mouth locks up states patient    Zyprexa Unspecified     \"jaw lock up\"       PHYSICAL EXAM  VITAL SIGNS: BP (!) 130/96   Pulse 89   Temp 37.2 °C (98.9 °F) (Temporal)   Resp 12   Ht 1.803 m (5' 11\")   Wt 68.9 kg (151 lb 14.4 oz)   SpO2 97%   BMI 21.19 kg/m²    Constitutional: Mildly anxious appearing, sitting upright in the gurney, calm and cooperative and well-appearing  HENT: Normocephalic, Atraumatic, Bilateral external ears normal. Nose normal.   Eyes: Pupils are equal and reactive. Conjunctiva normal, non-icteric.   Heart: Regular rate and rythm,   Lungs: No respiratory distress  GI: Soft nontender nondistended   Musculoskeletal: No obvious deformity. No leg edema.  Skin: Warm, Dry, No erythema, No rash.   Neurologic: Alert and oriented x 3, Cranial nerves III through XII grossly intact no sensory deficit no cerebellar dysfunction.   Psychiatric: Endorses auditory hallucinations.  No suicidal or homicidal ideation.  Clear and linear thought.      COURSE & MEDICAL DECISION MAKING    ASSESSMENT, COURSE AND PLAN  Care Narrative:     Patient with history of homelessness, schizophrenia, prior substance abuse disorder is presenting to the emergency department citing anxiety and for follow-up of his schizophrenia.  At the time of my evaluation patient is clear and linear thinking, calm and cooperative and denies suicidal or homicidal ideation.  He reports that he had not been taking his Seroquel " initially because it was making him very tired and sleepy and later because it was difficult to keep up with his medications being homeless.  He is requesting to stay a while and rest and have something to drink.  He is amenable to taking a dose of his nightly Seroquel.  Will give him a 25 mg dose so as not to induce too much somnolence.  Unfortunately patient has dystonic reaction to multiple additional antipsychotics making this the best therapeutic option for him.  I discussed my plan to represcribe his nightly Seroquel medications which she stated he will  from the pharmacy.  He is otherwise comfortable with plan for outpatient follow-up.  No indication for legal hold initiation or inpatient hospitalization for psychiatric care at this point.  No additional medical needs.  Patient discharged stable condition    ADDITIONAL PROBLEMS MANAGED  Homeless    DISPOSITION AND DISCUSSIONS  I have discussed management of the patient with the following physicians and TASIA's: None    Discussion of management with other [facility] or appropriate source(s): None     Escalation of care considered, and ultimately not performed:acute inpatient care management, however at this time, the patient is most appropriate for outpatient management    Barriers to care at this time, including but not limited to: Patient is homeless and Patient lacks financial resources.     FINAL DIAGNOSIS  1. Acute psychosis (HCC)    2. Paranoid schizophrenia (HCC) Chronic        Electronically signed by: Natanael Hunt M.D., 8/1/2024 11:32 PM

## 2024-08-02 NOTE — ED PROVIDER NOTES
"ED Provider Note    CHIEF COMPLAINT  Chief Complaint   Patient presents with    Medication Refill     Pt here last night for \"psychosis and acute schizophrenia\", given Seroquel with Rx for same, states unable to obtain meds from pharm, insists on being seen by ERP today and assistance getting meds       EXTERNAL RECORDS REVIEWED  Other seen in the ER last night, given a dose of Seroquel.  He is homeless, multiple ER visits.    HPI/ROS  LIMITATION TO HISTORY   Select: : None  OUTSIDE HISTORIAN(S):      Dallin Lowry is a 44 y.o. male who presents to the ED for medication refill.  The patient was seen here and evaluated yesterday for psych evaluation, the patient does have psychosis, is able to care for himself.  He did not  his medicines, states that he was instructed to come back to the ER for his medicine.  The patient denies any auditory, visual hallucinations.  Denies any chest pains, shortness of breath.    PAST MEDICAL HISTORY   has a past medical history of Auditory hallucinations, Bipolar 1 disorder, manic, moderate (HCC), Psychiatric disorder, and Schizophrenia ([facility]) (2001).    SURGICAL HISTORY  patient denies any surgical history    FAMILY HISTORY  History reviewed. No pertinent family history.    SOCIAL HISTORY  Social History     Tobacco Use    Smoking status: Former     Current packs/day: 0.50     Types: Cigarettes    Smokeless tobacco: Never   Vaping Use    Vaping status: Never Used   Substance and Sexual Activity    Alcohol use: No    Drug use: Not Currently     Types: Inhaled     Comment:  meth     Sexual activity: Not on file       CURRENT MEDICATIONS  Home Medications       Reviewed by Erica Alarcon R.N. (Registered Nurse) on 08/02/24 at 1405  Med List Status: Partial     Medication Last Dose Status   QUEtiapine (SEROQUEL) 200 MG Tab  Active                    ALLERGIES  Allergies   Allergen Reactions    Geodon [ ] Unspecified     \"I get lock jaw\"    Haldol [Haloperidol] Unspecified     " "\"I get lock jaw\"    Other Drug Unspecified     Pt reports having \"adverse reactions\" to \"knock off medications\" (various psych medications)  Pt reports reactions such as \"locked jaw\" and \"tongue rolling down throat\"    Risperdal  [Benzoic Acid-Risperidone] Unspecified     \"jaw locks up\"    Zyprexa Unspecified     \"jaw lock up\"       PHYSICAL EXAM  VITAL SIGNS: /81   Pulse 90   Temp 35.9 °C (96.6 °F) (Temporal)   Resp 16   Ht 1.803 m (5' 11\")   Wt 68.5 kg (151 lb)   SpO2 97%   BMI 21.06 kg/m²    Well-developed well-nourished 44-year-old male who appears in mild distress  Atraumatic, normocephalic, oropharynx with poor dentition  No respiratory distress  Good pulses  Neuro awake alert speech is clear  Odd affect, poor eye contact, does not appear to be psychotic at this point in time.      COURSE & MEDICAL DECISION MAKING    ASSESSMENT, COURSE AND PLAN  Care Narrative: Patient with psychosis here for medication, we have picked up the patient's medicines, will give him his Seroquel.  The patient will return with worsening symptoms            DISPOSITION AND DISCUSSIONS  Discussion of management with other [facility] or appropriate source(s): Social Work picked up the patient's medicines      FINAL DIAGNOSIS  1. Medication refill    2. History of schizophrenia         Electronically signed by: Mao Lowery M.D., 8/2/2024 3:28 PM      "

## 2024-08-02 NOTE — ED NOTES
Bedside report to April RN. Pt resting on edge of bed, GCS 15. Pt encouraged to site back in bed by Conrado ARIAS but pt refused saying it was not comfortable. Conrado ARIAS placed side rails up x 2. Bed locked in lowest position.

## 2024-09-20 ENCOUNTER — APPOINTMENT (OUTPATIENT)
Dept: URGENT CARE | Facility: CLINIC | Age: 44
End: 2024-09-20
Payer: COMMERCIAL

## 2025-04-22 ENCOUNTER — HOSPITAL ENCOUNTER (EMERGENCY)
Facility: MEDICAL CENTER | Age: 45
End: 2025-04-22
Attending: STUDENT IN AN ORGANIZED HEALTH CARE EDUCATION/TRAINING PROGRAM
Payer: COMMERCIAL

## 2025-04-22 VITALS
SYSTOLIC BLOOD PRESSURE: 132 MMHG | OXYGEN SATURATION: 98 % | BODY MASS INDEX: 20.53 KG/M2 | RESPIRATION RATE: 16 BRPM | HEIGHT: 74 IN | DIASTOLIC BLOOD PRESSURE: 78 MMHG | HEART RATE: 78 BPM | TEMPERATURE: 97.7 F | WEIGHT: 160 LBS

## 2025-04-22 DIAGNOSIS — F20.9 SCHIZOPHRENIA, UNSPECIFIED TYPE (HCC): Primary | ICD-10-CM

## 2025-04-22 PROCEDURE — 99283 EMERGENCY DEPT VISIT LOW MDM: CPT

## 2025-04-22 NOTE — ED TRIAGE NOTES
"Chief Complaint   Patient presents with    Psych Eval     Patient states that he is having a psych breakdown. Patient states that he has a psych history and unknown what meds he is taking. Patient states he wants to talk to a psychiatrist, and be admitted to a psych facility. States that tonight he wanted to punch a hole in a wall. Denies SI or HI       Pt is alert and oriented, speaking in full sentences, follows commands and responds appropriately to questions. Resperations are even and unlabored.         Patient and staff wearing appropriate PPE.    Ht 1.88 m (6' 2\")   Wt 72.6 kg (160 lb)    "

## 2025-04-22 NOTE — DISCHARGE INSTRUCTIONS
You were seen and evaluated in the emergency department for your psychiatric complaints.  He did not meet criteria for a legal hold.  Please follow-up with the above resources, you are provided extra resources.  Please return to the emergency department for any other concerning symptoms.

## 2025-04-22 NOTE — ED NOTES
Pt discharged to home. Discharge paperwork provided. Education provided by ERP. Reinforced discharge instructions.  Pt was given follow up instructions and bus pass  Pt verbalized understanding of all instructions for discharge.   Patient went out of the ER ambulatory with steady gait., alert and oriented x 4, with all belongings.

## 2025-08-04 ENCOUNTER — HOSPITAL ENCOUNTER (EMERGENCY)
Facility: MEDICAL CENTER | Age: 45
End: 2025-08-05
Attending: STUDENT IN AN ORGANIZED HEALTH CARE EDUCATION/TRAINING PROGRAM
Payer: COMMERCIAL

## 2025-08-04 DIAGNOSIS — R44.3 HALLUCINATIONS: Primary | ICD-10-CM

## 2025-08-04 PROCEDURE — 99283 EMERGENCY DEPT VISIT LOW MDM: CPT

## 2025-08-05 ENCOUNTER — PHARMACY VISIT (OUTPATIENT)
Dept: PHARMACY | Facility: MEDICAL CENTER | Age: 45
End: 2025-08-05
Payer: COMMERCIAL

## 2025-08-05 VITALS
OXYGEN SATURATION: 100 % | WEIGHT: 154.54 LBS | DIASTOLIC BLOOD PRESSURE: 95 MMHG | SYSTOLIC BLOOD PRESSURE: 140 MMHG | HEIGHT: 71 IN | TEMPERATURE: 97.9 F | BODY MASS INDEX: 21.64 KG/M2 | HEART RATE: 76 BPM | RESPIRATION RATE: 20 BRPM

## 2025-08-05 VITALS
OXYGEN SATURATION: 100 % | HEART RATE: 58 BPM | WEIGHT: 155.65 LBS | SYSTOLIC BLOOD PRESSURE: 118 MMHG | BODY MASS INDEX: 21.79 KG/M2 | DIASTOLIC BLOOD PRESSURE: 79 MMHG | RESPIRATION RATE: 16 BRPM | TEMPERATURE: 96.9 F

## 2025-08-05 DIAGNOSIS — F20.0 PARANOID SCHIZOPHRENIA (HCC): Primary | ICD-10-CM

## 2025-08-05 DIAGNOSIS — F20.0 PARANOID SCHIZOPHRENIA (HCC): ICD-10-CM

## 2025-08-05 PROCEDURE — 99284 EMERGENCY DEPT VISIT MOD MDM: CPT

## 2025-08-05 RX ORDER — QUETIAPINE FUMARATE 200 MG/1
200 TABLET, FILM COATED ORAL
Qty: 30 TABLET | Refills: 0 | Status: SHIPPED | OUTPATIENT
Start: 2025-08-05

## 2025-08-05 ASSESSMENT — PAIN DESCRIPTION - PAIN TYPE: TYPE: ACUTE PAIN
